# Patient Record
Sex: MALE | Race: WHITE | Employment: UNEMPLOYED | URBAN - METROPOLITAN AREA
[De-identification: names, ages, dates, MRNs, and addresses within clinical notes are randomized per-mention and may not be internally consistent; named-entity substitution may affect disease eponyms.]

---

## 2024-08-28 ENCOUNTER — HOSPITAL ENCOUNTER (EMERGENCY)
Age: 44
Discharge: HOME | End: 2024-08-28
Attending: EMERGENCY MEDICINE
Payer: MEDICAID

## 2024-08-28 ENCOUNTER — APPOINTMENT (OUTPATIENT)
Dept: CT IMAGING | Age: 44
End: 2024-08-28
Payer: MEDICAID

## 2024-08-28 ENCOUNTER — APPOINTMENT (OUTPATIENT)
Dept: GENERAL RADIOLOGY | Age: 44
End: 2024-08-28
Payer: MEDICAID

## 2024-08-28 VITALS
SYSTOLIC BLOOD PRESSURE: 112 MMHG | OXYGEN SATURATION: 98 % | HEIGHT: 71 IN | TEMPERATURE: 98 F | DIASTOLIC BLOOD PRESSURE: 71 MMHG | HEART RATE: 77 BPM | WEIGHT: 155 LBS | BODY MASS INDEX: 21.7 KG/M2 | RESPIRATION RATE: 15 BRPM
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
## 2024-08-28 DIAGNOSIS — S82.141A CLOSED FRACTURE OF RIGHT TIBIAL PLATEAU, INITIAL ENCOUNTER: Primary | ICD-10-CM

## 2024-08-28 DIAGNOSIS — M25.00 LIPOHEMARTHROSIS: ICD-10-CM

## 2024-08-28 PROCEDURE — 73560 X-RAY EXAM OF KNEE 1 OR 2: CPT

## 2024-08-28 PROCEDURE — 73700 CT LOWER EXTREMITY W/O DYE: CPT

## 2024-08-28 PROCEDURE — 99284 EMERGENCY DEPT VISIT MOD MDM: CPT

## 2024-08-28 PROCEDURE — 6370000000 HC RX 637 (ALT 250 FOR IP): Performed by: PHYSICIAN ASSISTANT

## 2024-08-28 PROCEDURE — 6370000000 HC RX 637 (ALT 250 FOR IP): Performed by: EMERGENCY MEDICINE

## 2024-08-28 RX ORDER — ACETAMINOPHEN 500 MG
1000 TABLET ORAL ONCE
Status: COMPLETED | OUTPATIENT
Start: 2024-08-28 | End: 2024-08-28

## 2024-08-28 RX ORDER — OXYCODONE AND ACETAMINOPHEN 5; 325 MG/1; MG/1
1 TABLET ORAL EVERY 4 HOURS PRN
Qty: 17 TABLET | Refills: 0 | Status: SHIPPED | OUTPATIENT
Start: 2024-08-28 | End: 2024-08-31

## 2024-08-28 RX ORDER — OXYCODONE HYDROCHLORIDE 5 MG/1
5 TABLET ORAL ONCE
Status: COMPLETED | OUTPATIENT
Start: 2024-08-28 | End: 2024-08-28

## 2024-08-28 RX ORDER — IBUPROFEN 800 MG/1
800 TABLET, FILM COATED ORAL ONCE
Status: COMPLETED | OUTPATIENT
Start: 2024-08-28 | End: 2024-08-28

## 2024-08-28 RX ADMIN — ACETAMINOPHEN 1000 MG: 500 TABLET ORAL at 05:14

## 2024-08-28 RX ADMIN — IBUPROFEN 800 MG: 800 TABLET, FILM COATED ORAL at 04:13

## 2024-08-28 RX ADMIN — OXYCODONE HYDROCHLORIDE 5 MG: 5 TABLET ORAL at 01:47

## 2024-08-28 ASSESSMENT — PAIN SCALES - GENERAL
PAINLEVEL_OUTOF10: 7
PAINLEVEL_OUTOF10: 9
PAINLEVEL_OUTOF10: 6

## 2024-08-28 ASSESSMENT — PAIN DESCRIPTION - LOCATION
LOCATION: KNEE
LOCATION: KNEE

## 2024-08-28 ASSESSMENT — PAIN DESCRIPTION - ORIENTATION: ORIENTATION: RIGHT

## 2024-08-28 ASSESSMENT — LIFESTYLE VARIABLES
HOW MANY STANDARD DRINKS CONTAINING ALCOHOL DO YOU HAVE ON A TYPICAL DAY: 1 OR 2
HOW OFTEN DO YOU HAVE A DRINK CONTAINING ALCOHOL: MONTHLY OR LESS

## 2024-08-28 ASSESSMENT — PAIN DESCRIPTION - DESCRIPTORS: DESCRIPTORS: ACHING
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
## 2024-08-30 ENCOUNTER — OFFICE VISIT (OUTPATIENT)
Dept: ORTHOPEDIC SURGERY | Age: 44
End: 2024-08-30
Payer: MEDICAID

## 2024-08-30 VITALS — WEIGHT: 155 LBS | HEIGHT: 71 IN | BODY MASS INDEX: 21.7 KG/M2

## 2024-08-30 DIAGNOSIS — S82.141A TIBIAL PLATEAU FRACTURE, RIGHT, CLOSED, INITIAL ENCOUNTER: Primary | ICD-10-CM

## 2024-08-30 PROCEDURE — 99204 OFFICE O/P NEW MOD 45 MIN: CPT | Performed by: ORTHOPAEDIC SURGERY
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
## 2024-09-05 ENCOUNTER — TELEPHONE (OUTPATIENT)
Dept: ORTHOPEDIC SURGERY | Age: 44
End: 2024-09-05

## 2024-09-05 NOTE — TELEPHONE ENCOUNTER
Prescription Refill     Medication Name:  PERCOCET  Pharmacy: [facility]  Patient Contact Number:  [phone]

## 2024-11-11 ENCOUNTER — HOSPITAL ENCOUNTER (INPATIENT)
Age: 44
LOS: 3 days | Discharge: HOME | End: 2024-11-15
Attending: EMERGENCY MEDICINE | Admitting: FAMILY MEDICINE
Payer: MEDICAID

## 2024-11-11 DIAGNOSIS — F10.930 ALCOHOL WITHDRAWAL SYNDROME WITHOUT COMPLICATION (HCC): Primary | ICD-10-CM

## 2024-11-11 DIAGNOSIS — R19.7 NAUSEA VOMITING AND DIARRHEA: ICD-10-CM

## 2024-11-11 DIAGNOSIS — R11.2 NAUSEA VOMITING AND DIARRHEA: ICD-10-CM

## 2024-11-11 LAB
ALBUMIN SERPL-MCNC: 5.4 G/DL (ref 3.4–5)
ALBUMIN/GLOB SERPL: 1.6 {RATIO} (ref 1.1–2.2)
ALP SERPL-CCNC: 111 U/L (ref 40–129)
ALT SERPL-CCNC: 56 U/L (ref 10–40)
ANION GAP SERPL CALCULATED.3IONS-SCNC: 26 MMOL/L (ref 3–16)
AST SERPL-CCNC: 59 U/L (ref 15–37)
BASOPHILS # BLD: 0 K/UL (ref 0–0.2)
BASOPHILS NFR BLD: 0.4 %
BILIRUB SERPL-MCNC: 0.4 MG/DL (ref 0–1)
BUN SERPL-MCNC: 9 MG/DL (ref 7–20)
CALCIUM SERPL-MCNC: 10.3 MG/DL (ref 8.3–10.6)
CHLORIDE SERPL-SCNC: 94 MMOL/L (ref 99–110)
CO2 SERPL-SCNC: 21 MMOL/L (ref 21–32)
CREAT SERPL-MCNC: 1.2 MG/DL (ref 0.9–1.3)
DEPRECATED RDW RBC AUTO: 13.5 % (ref 12.4–15.4)
EOSINOPHIL # BLD: 0 K/UL (ref 0–0.6)
EOSINOPHIL NFR BLD: 0 %
GFR SERPLBLD CREATININE-BSD FMLA CKD-EPI: 76 ML/MIN/{1.73_M2}
GLUCOSE SERPL-MCNC: 213 MG/DL (ref 70–99)
HCT VFR BLD AUTO: 44.8 % (ref 40.5–52.5)
HGB BLD-MCNC: 15 G/DL (ref 13.5–17.5)
LIPASE SERPL-CCNC: 51 U/L (ref 13–60)
LYMPHOCYTES # BLD: 0.9 K/UL (ref 1–5.1)
LYMPHOCYTES NFR BLD: 10.2 %
MCH RBC QN AUTO: 32.7 PG (ref 26–34)
MCHC RBC AUTO-ENTMCNC: 33.4 G/DL (ref 31–36)
MCV RBC AUTO: 97.9 FL (ref 80–100)
MONOCYTES # BLD: 0.8 K/UL (ref 0–1.3)
MONOCYTES NFR BLD: 9.2 %
NEUTROPHILS # BLD: 6.7 K/UL (ref 1.7–7.7)
NEUTROPHILS NFR BLD: 80.2 %
PLATELET # BLD AUTO: 267 K/UL (ref 135–450)
PMV BLD AUTO: 7.8 FL (ref 5–10.5)
POTASSIUM SERPL-SCNC: 3.3 MMOL/L (ref 3.5–5.1)
PROT SERPL-MCNC: 8.8 G/DL (ref 6.4–8.2)
RBC # BLD AUTO: 4.58 M/UL (ref 4.2–5.9)
SODIUM SERPL-SCNC: 141 MMOL/L (ref 136–145)
WBC # BLD AUTO: 8.4 K/UL (ref 4–11)

## 2024-11-11 PROCEDURE — 96374 THER/PROPH/DIAG INJ IV PUSH: CPT

## 2024-11-11 PROCEDURE — 82077 ASSAY SPEC XCP UR&BREATH IA: CPT

## 2024-11-11 PROCEDURE — 6360000002 HC RX W HCPCS: Performed by: EMERGENCY MEDICINE

## 2024-11-11 PROCEDURE — 96361 HYDRATE IV INFUSION ADD-ON: CPT

## 2024-11-11 PROCEDURE — 82607 VITAMIN B-12: CPT

## 2024-11-11 PROCEDURE — 36415 COLL VENOUS BLD VENIPUNCTURE: CPT

## 2024-11-11 PROCEDURE — 83690 ASSAY OF LIPASE: CPT

## 2024-11-11 PROCEDURE — 99285 EMERGENCY DEPT VISIT HI MDM: CPT

## 2024-11-11 PROCEDURE — 83605 ASSAY OF LACTIC ACID: CPT

## 2024-11-11 PROCEDURE — 93005 ELECTROCARDIOGRAM TRACING: CPT | Performed by: EMERGENCY MEDICINE

## 2024-11-11 PROCEDURE — 85025 COMPLETE CBC W/AUTO DIFF WBC: CPT

## 2024-11-11 PROCEDURE — 83735 ASSAY OF MAGNESIUM: CPT

## 2024-11-11 PROCEDURE — 96375 TX/PRO/DX INJ NEW DRUG ADDON: CPT

## 2024-11-11 PROCEDURE — 2580000003 HC RX 258: Performed by: EMERGENCY MEDICINE

## 2024-11-11 PROCEDURE — 80053 COMPREHEN METABOLIC PANEL: CPT

## 2024-11-11 PROCEDURE — 99291 CRITICAL CARE FIRST HOUR: CPT

## 2024-11-11 RX ORDER — LORAZEPAM 2 MG/ML
0.5 INJECTION INTRAMUSCULAR ONCE
Status: COMPLETED | OUTPATIENT
Start: 2024-11-11 | End: 2024-11-11

## 2024-11-11 RX ORDER — ONDANSETRON 2 MG/ML
4 INJECTION INTRAMUSCULAR; INTRAVENOUS ONCE
Status: COMPLETED | OUTPATIENT
Start: 2024-11-11 | End: 2024-11-11

## 2024-11-11 RX ORDER — 0.9 % SODIUM CHLORIDE 0.9 %
1000 INTRAVENOUS SOLUTION INTRAVENOUS ONCE
Status: COMPLETED | OUTPATIENT
Start: 2024-11-11 | End: 2024-11-12

## 2024-11-11 RX ADMIN — SODIUM CHLORIDE 1000 ML: 9 INJECTION, SOLUTION INTRAVENOUS at 23:27

## 2024-11-11 RX ADMIN — LORAZEPAM 0.5 MG: 2 INJECTION INTRAMUSCULAR; INTRAVENOUS at 23:47

## 2024-11-11 RX ADMIN — ONDANSETRON 4 MG: 2 INJECTION INTRAMUSCULAR; INTRAVENOUS at 23:27

## 2024-11-11 ASSESSMENT — PAIN DESCRIPTION - ORIENTATION: ORIENTATION: RIGHT;LEFT

## 2024-11-11 ASSESSMENT — PAIN DESCRIPTION - LOCATION: LOCATION: FOOT

## 2024-11-11 ASSESSMENT — PAIN - FUNCTIONAL ASSESSMENT: PAIN_FUNCTIONAL_ASSESSMENT: 0-10

## 2024-11-11 ASSESSMENT — PAIN SCALES - GENERAL: PAINLEVEL_OUTOF10: 4

## 2024-11-12 PROBLEM — F10.930 ALCOHOL WITHDRAWAL SYNDROME, UNCOMPLICATED (HCC): Status: ACTIVE | Noted: 2024-11-12

## 2024-11-12 LAB
ALBUMIN SERPL-MCNC: 4.4 G/DL (ref 3.4–5)
ALBUMIN/GLOB SERPL: 1.7 {RATIO} (ref 1.1–2.2)
ALP SERPL-CCNC: 81 U/L (ref 40–129)
ALT SERPL-CCNC: 41 U/L (ref 10–40)
AMPHETAMINES UR QL SCN>1000 NG/ML: ABNORMAL
ANION GAP SERPL CALCULATED.3IONS-SCNC: 15 MMOL/L (ref 3–16)
AST SERPL-CCNC: 43 U/L (ref 15–37)
BARBITURATES UR QL SCN>200 NG/ML: ABNORMAL
BASE EXCESS BLDV CALC-SCNC: -1.5 MMOL/L (ref -3–3)
BASOPHILS # BLD: 0.1 K/UL (ref 0–0.2)
BASOPHILS NFR BLD: 0.8 %
BENZODIAZ UR QL SCN>200 NG/ML: POSITIVE
BILIRUB SERPL-MCNC: 0.3 MG/DL (ref 0–1)
BUN SERPL-MCNC: 9 MG/DL (ref 7–20)
CALCIUM SERPL-MCNC: 8.9 MG/DL (ref 8.3–10.6)
CANNABINOIDS UR QL SCN>50 NG/ML: POSITIVE
CHLORIDE SERPL-SCNC: 105 MMOL/L (ref 99–110)
CO2 BLDV-SCNC: 21 MMOL/L
CO2 SERPL-SCNC: 22 MMOL/L (ref 21–32)
COCAINE UR QL SCN: ABNORMAL
COHGB MFR BLDV: 2.5 % (ref 0–1.5)
CREAT SERPL-MCNC: 0.8 MG/DL (ref 0.9–1.3)
DEPRECATED RDW RBC AUTO: 13.2 % (ref 12.4–15.4)
DRUG SCREEN COMMENT UR-IMP: ABNORMAL
EKG ATRIAL RATE: 86 BPM
EKG DIAGNOSIS: NORMAL
EKG P AXIS: 62 DEGREES
EKG P-R INTERVAL: 158 MS
EKG Q-T INTERVAL: 408 MS
EKG QRS DURATION: 88 MS
EKG QTC CALCULATION (BAZETT): 488 MS
EKG R AXIS: 66 DEGREES
EKG T AXIS: 78 DEGREES
EKG VENTRICULAR RATE: 86 BPM
EOSINOPHIL # BLD: 0 K/UL (ref 0–0.6)
EOSINOPHIL NFR BLD: 0.1 %
ETHANOLAMINE SERPL-MCNC: NORMAL MG/DL (ref 0–0.08)
FENTANYL SCREEN, URINE: ABNORMAL
GFR SERPLBLD CREATININE-BSD FMLA CKD-EPI: >90 ML/MIN/{1.73_M2}
GLUCOSE SERPL-MCNC: 123 MG/DL (ref 70–99)
HCO3 BLDV-SCNC: 20.6 MMOL/L (ref 23–29)
HCT VFR BLD AUTO: 37.5 % (ref 40.5–52.5)
HGB BLD-MCNC: 12.6 G/DL (ref 13.5–17.5)
LACTATE BLDV-SCNC: 1.5 MMOL/L (ref 0.4–1.9)
LACTATE BLDV-SCNC: 7.1 MMOL/L (ref 0.4–1.9)
LYMPHOCYTES # BLD: 0.8 K/UL (ref 1–5.1)
LYMPHOCYTES NFR BLD: 10.3 %
MAGNESIUM SERPL-MCNC: 1.74 MG/DL (ref 1.8–2.4)
MAGNESIUM SERPL-MCNC: 1.85 MG/DL (ref 1.8–2.4)
MCH RBC QN AUTO: 32.8 PG (ref 26–34)
MCHC RBC AUTO-ENTMCNC: 33.7 G/DL (ref 31–36)
MCV RBC AUTO: 97.3 FL (ref 80–100)
METHADONE UR QL SCN>300 NG/ML: ABNORMAL
METHGB MFR BLDV: 0.2 %
MONOCYTES # BLD: 0.9 K/UL (ref 0–1.3)
MONOCYTES NFR BLD: 11 %
NEUTROPHILS # BLD: 6.2 K/UL (ref 1.7–7.7)
NEUTROPHILS NFR BLD: 77.8 %
O2 THERAPY: ABNORMAL
OPIATES UR QL SCN>300 NG/ML: ABNORMAL
OXYCODONE UR QL SCN: ABNORMAL
PCO2 BLDV: 28 MMHG (ref 40–50)
PCP UR QL SCN>25 NG/ML: ABNORMAL
PH BLDV: 7.48 [PH] (ref 7.35–7.45)
PH UR STRIP: 7 [PH]
PLATELET # BLD AUTO: 215 K/UL (ref 135–450)
PMV BLD AUTO: 8.4 FL (ref 5–10.5)
PO2 BLDV: 43.6 MMHG (ref 25–40)
POTASSIUM SERPL-SCNC: 4 MMOL/L (ref 3.5–5.1)
PROT SERPL-MCNC: 7 G/DL (ref 6.4–8.2)
RBC # BLD AUTO: 3.85 M/UL (ref 4.2–5.9)
SAO2 % BLDV: 83 %
SODIUM SERPL-SCNC: 142 MMOL/L (ref 136–145)
VIT B12 SERPL-MCNC: 445 PG/ML (ref 211–911)
WBC # BLD AUTO: 8 K/UL (ref 4–11)

## 2024-11-12 PROCEDURE — 2580000003 HC RX 258: Performed by: EMERGENCY MEDICINE

## 2024-11-12 PROCEDURE — 80307 DRUG TEST PRSMV CHEM ANLYZR: CPT

## 2024-11-12 PROCEDURE — 6360000002 HC RX W HCPCS: Performed by: FAMILY MEDICINE

## 2024-11-12 PROCEDURE — 93010 ELECTROCARDIOGRAM REPORT: CPT | Performed by: INTERNAL MEDICINE

## 2024-11-12 PROCEDURE — 6370000000 HC RX 637 (ALT 250 FOR IP): Performed by: FAMILY MEDICINE

## 2024-11-12 PROCEDURE — 96375 TX/PRO/DX INJ NEW DRUG ADDON: CPT

## 2024-11-12 PROCEDURE — 36415 COLL VENOUS BLD VENIPUNCTURE: CPT

## 2024-11-12 PROCEDURE — 2500000003 HC RX 250 WO HCPCS: Performed by: FAMILY MEDICINE

## 2024-11-12 PROCEDURE — 2500000003 HC RX 250 WO HCPCS: Performed by: EMERGENCY MEDICINE

## 2024-11-12 PROCEDURE — 96376 TX/PRO/DX INJ SAME DRUG ADON: CPT

## 2024-11-12 PROCEDURE — 82803 BLOOD GASES ANY COMBINATION: CPT

## 2024-11-12 PROCEDURE — 6360000002 HC RX W HCPCS: Performed by: EMERGENCY MEDICINE

## 2024-11-12 PROCEDURE — 2580000003 HC RX 258: Performed by: FAMILY MEDICINE

## 2024-11-12 PROCEDURE — 85025 COMPLETE CBC W/AUTO DIFF WBC: CPT

## 2024-11-12 PROCEDURE — 80053 COMPREHEN METABOLIC PANEL: CPT

## 2024-11-12 PROCEDURE — 6370000000 HC RX 637 (ALT 250 FOR IP)

## 2024-11-12 PROCEDURE — 83605 ASSAY OF LACTIC ACID: CPT

## 2024-11-12 PROCEDURE — 83735 ASSAY OF MAGNESIUM: CPT

## 2024-11-12 PROCEDURE — 6370000000 HC RX 637 (ALT 250 FOR IP): Performed by: EMERGENCY MEDICINE

## 2024-11-12 PROCEDURE — 2000000000 HC ICU R&B

## 2024-11-12 RX ORDER — SODIUM CHLORIDE 0.9 % (FLUSH) 0.9 %
5-40 SYRINGE (ML) INJECTION EVERY 12 HOURS SCHEDULED
Status: DISCONTINUED | OUTPATIENT
Start: 2024-11-12 | End: 2024-11-12

## 2024-11-12 RX ORDER — POTASSIUM CHLORIDE 7.45 MG/ML
10 INJECTION INTRAVENOUS PRN
Status: DISCONTINUED | OUTPATIENT
Start: 2024-11-12 | End: 2024-11-14

## 2024-11-12 RX ORDER — POTASSIUM CHLORIDE 1500 MG/1
40 TABLET, EXTENDED RELEASE ORAL PRN
Status: DISCONTINUED | OUTPATIENT
Start: 2024-11-12 | End: 2024-11-14

## 2024-11-12 RX ORDER — SODIUM CHLORIDE 0.9 % (FLUSH) 0.9 %
5-40 SYRINGE (ML) INJECTION PRN
Status: DISCONTINUED | OUTPATIENT
Start: 2024-11-12 | End: 2024-11-15 | Stop reason: HOSPADM

## 2024-11-12 RX ORDER — ATORVASTATIN CALCIUM 20 MG/1
20 TABLET, FILM COATED ORAL DAILY
COMMUNITY
Start: 2024-09-12

## 2024-11-12 RX ORDER — MAGNESIUM SULFATE IN WATER 40 MG/ML
2000 INJECTION, SOLUTION INTRAVENOUS PRN
Status: DISCONTINUED | OUTPATIENT
Start: 2024-11-12 | End: 2024-11-14

## 2024-11-12 RX ORDER — LORAZEPAM 1 MG/1
1 TABLET ORAL
Status: DISCONTINUED | OUTPATIENT
Start: 2024-11-12 | End: 2024-11-15 | Stop reason: HOSPADM

## 2024-11-12 RX ORDER — ACETAMINOPHEN 325 MG/1
TABLET ORAL
COMMUNITY

## 2024-11-12 RX ORDER — NICOTINE 21 MG/24HR
1 PATCH, TRANSDERMAL 24 HOURS TRANSDERMAL DAILY
Status: DISCONTINUED | OUTPATIENT
Start: 2024-11-12 | End: 2024-11-15 | Stop reason: HOSPADM

## 2024-11-12 RX ORDER — METOPROLOL SUCCINATE 25 MG/1
12.5 TABLET, EXTENDED RELEASE ORAL DAILY
Status: DISCONTINUED | OUTPATIENT
Start: 2024-11-12 | End: 2024-11-15 | Stop reason: HOSPADM

## 2024-11-12 RX ORDER — MUPIROCIN 20 MG/G
OINTMENT TOPICAL 2 TIMES DAILY
Status: DISCONTINUED | OUTPATIENT
Start: 2024-11-12 | End: 2024-11-15 | Stop reason: HOSPADM

## 2024-11-12 RX ORDER — DEXMEDETOMIDINE HYDROCHLORIDE 4 UG/ML
.1-1.5 INJECTION, SOLUTION INTRAVENOUS CONTINUOUS
Status: DISCONTINUED | OUTPATIENT
Start: 2024-11-12 | End: 2024-11-15 | Stop reason: HOSPADM

## 2024-11-12 RX ORDER — LORAZEPAM 1 MG/1
4 TABLET ORAL
Status: DISCONTINUED | OUTPATIENT
Start: 2024-11-12 | End: 2024-11-14

## 2024-11-12 RX ORDER — LORAZEPAM 2 MG/ML
3 INJECTION INTRAMUSCULAR
Status: DISCONTINUED | OUTPATIENT
Start: 2024-11-12 | End: 2024-11-14

## 2024-11-12 RX ORDER — SODIUM CHLORIDE, SODIUM LACTATE, POTASSIUM CHLORIDE, CALCIUM CHLORIDE 600; 310; 30; 20 MG/100ML; MG/100ML; MG/100ML; MG/100ML
INJECTION, SOLUTION INTRAVENOUS CONTINUOUS
Status: DISCONTINUED | OUTPATIENT
Start: 2024-11-12 | End: 2024-11-15 | Stop reason: HOSPADM

## 2024-11-12 RX ORDER — SPIRONOLACTONE 25 MG/1
TABLET ORAL
COMMUNITY
Start: 2024-07-18

## 2024-11-12 RX ORDER — SODIUM CHLORIDE 9 MG/ML
INJECTION, SOLUTION INTRAVENOUS PRN
Status: DISCONTINUED | OUTPATIENT
Start: 2024-11-12 | End: 2024-11-12

## 2024-11-12 RX ORDER — SODIUM CHLORIDE 9 MG/ML
INJECTION, SOLUTION INTRAVENOUS PRN
Status: DISCONTINUED | OUTPATIENT
Start: 2024-11-12 | End: 2024-11-15 | Stop reason: HOSPADM

## 2024-11-12 RX ORDER — VALSARTAN 40 MG/1
TABLET ORAL
COMMUNITY
Start: 2024-08-19

## 2024-11-12 RX ORDER — ASPIRIN 81 MG/1
81 TABLET, CHEWABLE ORAL DAILY
COMMUNITY
Start: 2024-05-09

## 2024-11-12 RX ORDER — LANOLIN ALCOHOL/MO/W.PET/CERES
CREAM (GRAM) TOPICAL
COMMUNITY
Start: 2024-10-23

## 2024-11-12 RX ORDER — POTASSIUM CHLORIDE 7.45 MG/ML
10 INJECTION INTRAVENOUS ONCE
Status: COMPLETED | OUTPATIENT
Start: 2024-11-12 | End: 2024-11-12

## 2024-11-12 RX ORDER — SODIUM CHLORIDE 0.9 % (FLUSH) 0.9 %
5-40 SYRINGE (ML) INJECTION PRN
Status: DISCONTINUED | OUTPATIENT
Start: 2024-11-12 | End: 2024-11-12

## 2024-11-12 RX ORDER — ONDANSETRON 2 MG/ML
4 INJECTION INTRAMUSCULAR; INTRAVENOUS EVERY 6 HOURS PRN
Status: DISCONTINUED | OUTPATIENT
Start: 2024-11-12 | End: 2024-11-15 | Stop reason: HOSPADM

## 2024-11-12 RX ORDER — SPIRONOLACTONE 25 MG/1
25 TABLET ORAL DAILY
Status: DISCONTINUED | OUTPATIENT
Start: 2024-11-12 | End: 2024-11-15 | Stop reason: HOSPADM

## 2024-11-12 RX ORDER — DIAZEPAM 5 MG/1
5 TABLET ORAL 3 TIMES DAILY
Status: DISCONTINUED | OUTPATIENT
Start: 2024-11-12 | End: 2024-11-12

## 2024-11-12 RX ORDER — ACETAMINOPHEN 325 MG/1
650 TABLET ORAL EVERY 6 HOURS PRN
Status: DISCONTINUED | OUTPATIENT
Start: 2024-11-12 | End: 2024-11-15 | Stop reason: HOSPADM

## 2024-11-12 RX ORDER — LORAZEPAM 1 MG/1
2 TABLET ORAL
Status: DISCONTINUED | OUTPATIENT
Start: 2024-11-12 | End: 2024-11-15 | Stop reason: HOSPADM

## 2024-11-12 RX ORDER — ACETAMINOPHEN 650 MG/1
650 SUPPOSITORY RECTAL EVERY 6 HOURS PRN
Status: DISCONTINUED | OUTPATIENT
Start: 2024-11-12 | End: 2024-11-15 | Stop reason: HOSPADM

## 2024-11-12 RX ORDER — LORAZEPAM 1 MG/1
3 TABLET ORAL
Status: DISCONTINUED | OUTPATIENT
Start: 2024-11-12 | End: 2024-11-14

## 2024-11-12 RX ORDER — ENOXAPARIN SODIUM 100 MG/ML
40 INJECTION SUBCUTANEOUS DAILY
Status: DISCONTINUED | OUTPATIENT
Start: 2024-11-12 | End: 2024-11-15 | Stop reason: HOSPADM

## 2024-11-12 RX ORDER — POLYETHYLENE GLYCOL 3350 17 G/17G
17 POWDER, FOR SOLUTION ORAL DAILY PRN
Status: DISCONTINUED | OUTPATIENT
Start: 2024-11-12 | End: 2024-11-15 | Stop reason: HOSPADM

## 2024-11-12 RX ORDER — METOPROLOL SUCCINATE 25 MG/1
12.5 TABLET, EXTENDED RELEASE ORAL DAILY
COMMUNITY
Start: 2024-09-08

## 2024-11-12 RX ORDER — PROMETHAZINE HYDROCHLORIDE 25 MG/1
12.5 TABLET ORAL EVERY 6 HOURS PRN
Status: DISCONTINUED | OUTPATIENT
Start: 2024-11-12 | End: 2024-11-15 | Stop reason: HOSPADM

## 2024-11-12 RX ORDER — LANOLIN ALCOHOL/MO/W.PET/CERES
100 CREAM (GRAM) TOPICAL DAILY
Status: DISCONTINUED | OUTPATIENT
Start: 2024-11-12 | End: 2024-11-15 | Stop reason: HOSPADM

## 2024-11-12 RX ORDER — ATORVASTATIN CALCIUM 10 MG/1
20 TABLET, FILM COATED ORAL DAILY
Status: DISCONTINUED | OUTPATIENT
Start: 2024-11-12 | End: 2024-11-15 | Stop reason: HOSPADM

## 2024-11-12 RX ORDER — SODIUM CHLORIDE 0.9 % (FLUSH) 0.9 %
5-40 SYRINGE (ML) INJECTION EVERY 12 HOURS SCHEDULED
Status: DISCONTINUED | OUTPATIENT
Start: 2024-11-12 | End: 2024-11-15 | Stop reason: HOSPADM

## 2024-11-12 RX ORDER — VALSARTAN 80 MG/1
40 TABLET ORAL DAILY
Status: DISCONTINUED | OUTPATIENT
Start: 2024-11-12 | End: 2024-11-15 | Stop reason: HOSPADM

## 2024-11-12 RX ORDER — GABAPENTIN 300 MG/1
300 CAPSULE ORAL 3 TIMES DAILY
COMMUNITY
Start: 2024-10-22

## 2024-11-12 RX ORDER — LORAZEPAM 2 MG/ML
1 INJECTION INTRAMUSCULAR
Status: DISCONTINUED | OUTPATIENT
Start: 2024-11-12 | End: 2024-11-15 | Stop reason: HOSPADM

## 2024-11-12 RX ORDER — ASPIRIN 81 MG/1
81 TABLET, CHEWABLE ORAL DAILY
Status: DISCONTINUED | OUTPATIENT
Start: 2024-11-12 | End: 2024-11-15 | Stop reason: HOSPADM

## 2024-11-12 RX ORDER — HYDRALAZINE HYDROCHLORIDE 20 MG/ML
10 INJECTION INTRAMUSCULAR; INTRAVENOUS EVERY 6 HOURS PRN
Status: DISCONTINUED | OUTPATIENT
Start: 2024-11-12 | End: 2024-11-15 | Stop reason: HOSPADM

## 2024-11-12 RX ORDER — LORAZEPAM 2 MG/ML
2 INJECTION INTRAMUSCULAR
Status: DISCONTINUED | OUTPATIENT
Start: 2024-11-12 | End: 2024-11-15 | Stop reason: HOSPADM

## 2024-11-12 RX ORDER — LORAZEPAM 2 MG/ML
4 INJECTION INTRAMUSCULAR
Status: DISCONTINUED | OUTPATIENT
Start: 2024-11-12 | End: 2024-11-14

## 2024-11-12 RX ADMIN — VALSARTAN 40 MG: 80 TABLET, FILM COATED ORAL at 10:33

## 2024-11-12 RX ADMIN — LORAZEPAM 2 MG: 2 INJECTION INTRAMUSCULAR; INTRAVENOUS at 08:33

## 2024-11-12 RX ADMIN — SODIUM CHLORIDE, POTASSIUM CHLORIDE, SODIUM LACTATE AND CALCIUM CHLORIDE: 600; 310; 30; 20 INJECTION, SOLUTION INTRAVENOUS at 19:45

## 2024-11-12 RX ADMIN — LORAZEPAM 3 MG: 1 TABLET ORAL at 03:17

## 2024-11-12 RX ADMIN — SPIRONOLACTONE 25 MG: 25 TABLET, FILM COATED ORAL at 10:33

## 2024-11-12 RX ADMIN — Medication 100 MG: at 10:33

## 2024-11-12 RX ADMIN — LORAZEPAM 4 MG: 2 INJECTION INTRAMUSCULAR; INTRAVENOUS at 02:06

## 2024-11-12 RX ADMIN — LORAZEPAM 4 MG: 2 INJECTION INTRAMUSCULAR; INTRAVENOUS at 04:10

## 2024-11-12 RX ADMIN — ACETAMINOPHEN 650 MG: 325 TABLET ORAL at 17:38

## 2024-11-12 RX ADMIN — SODIUM CHLORIDE, PRESERVATIVE FREE 10 ML: 5 INJECTION INTRAVENOUS at 19:46

## 2024-11-12 RX ADMIN — SODIUM CHLORIDE, POTASSIUM CHLORIDE, SODIUM LACTATE AND CALCIUM CHLORIDE: 600; 310; 30; 20 INJECTION, SOLUTION INTRAVENOUS at 02:36

## 2024-11-12 RX ADMIN — LORAZEPAM 4 MG: 2 INJECTION INTRAMUSCULAR; INTRAVENOUS at 23:54

## 2024-11-12 RX ADMIN — LORAZEPAM 4 MG: 2 INJECTION INTRAMUSCULAR; INTRAVENOUS at 10:38

## 2024-11-12 RX ADMIN — ASPIRIN 81 MG: 81 TABLET, CHEWABLE ORAL at 10:33

## 2024-11-12 RX ADMIN — SODIUM CHLORIDE, PRESERVATIVE FREE 10 ML: 5 INJECTION INTRAVENOUS at 08:38

## 2024-11-12 RX ADMIN — LORAZEPAM 4 MG: 2 INJECTION INTRAMUSCULAR; INTRAVENOUS at 20:31

## 2024-11-12 RX ADMIN — THIAMINE HYDROCHLORIDE: 100 INJECTION, SOLUTION INTRAMUSCULAR; INTRAVENOUS at 00:34

## 2024-11-12 RX ADMIN — SODIUM CHLORIDE, POTASSIUM CHLORIDE, SODIUM LACTATE AND CALCIUM CHLORIDE: 600; 310; 30; 20 INJECTION, SOLUTION INTRAVENOUS at 11:51

## 2024-11-12 RX ADMIN — LORAZEPAM 2 MG: 2 INJECTION INTRAMUSCULAR; INTRAVENOUS at 12:50

## 2024-11-12 RX ADMIN — LORAZEPAM 3 MG: 2 INJECTION INTRAMUSCULAR; INTRAVENOUS at 14:16

## 2024-11-12 RX ADMIN — MUPIROCIN: 20 OINTMENT TOPICAL at 08:37

## 2024-11-12 RX ADMIN — LORAZEPAM 4 MG: 2 INJECTION INTRAMUSCULAR; INTRAVENOUS at 00:22

## 2024-11-12 RX ADMIN — ONDANSETRON 4 MG: 2 INJECTION INTRAMUSCULAR; INTRAVENOUS at 04:23

## 2024-11-12 RX ADMIN — ATORVASTATIN CALCIUM 20 MG: 10 TABLET, FILM COATED ORAL at 10:33

## 2024-11-12 RX ADMIN — POTASSIUM CHLORIDE 10 MEQ: 7.46 INJECTION, SOLUTION INTRAVENOUS at 00:46

## 2024-11-12 RX ADMIN — LORAZEPAM 4 MG: 2 INJECTION INTRAMUSCULAR; INTRAVENOUS at 05:12

## 2024-11-12 RX ADMIN — METOPROLOL SUCCINATE 12.5 MG: 25 TABLET, EXTENDED RELEASE ORAL at 10:34

## 2024-11-12 RX ADMIN — MUPIROCIN: 20 OINTMENT TOPICAL at 19:46

## 2024-11-12 RX ADMIN — Medication 1 MCG/KG/HR: at 05:48

## 2024-11-12 RX ADMIN — LORAZEPAM 3 MG: 2 INJECTION INTRAMUSCULAR; INTRAVENOUS at 17:16

## 2024-11-12 RX ADMIN — ENOXAPARIN SODIUM 40 MG: 100 INJECTION SUBCUTANEOUS at 08:37

## 2024-11-12 ASSESSMENT — PAIN SCALES - GENERAL
PAINLEVEL_OUTOF10: 0
PAINLEVEL_OUTOF10: 4

## 2024-11-12 ASSESSMENT — PAIN DESCRIPTION - LOCATION: LOCATION: WRIST

## 2024-11-12 NOTE — PROGRESS NOTES
0140- at room 239 from ED.   AO x4, on room air, vital signs stable. Afebrile.   Oriented to the room, safety measures applied, call light within reach.  Bathed with CHG wipes.

## 2024-11-12 NOTE — PROGRESS NOTES
4 Eyes Skin Assessment     NAME:  Julian Frost  YOB: 1980  MEDICAL RECORD NUMBER:  [id number]    The patient is being assessed for  Admission    I agree that at least one RN has performed a thorough Head to Toe Skin Assessment on the patient. ALL assessment sites listed below have been assessed.      Areas assessed by both nurses:    Head, Face, Ears, Shoulders, Back, Chest, Arms, Elbows, Hands, Sacrum. Buttock, Coccyx, Ischium, Legs. Feet and Heels, and Under Medical Devices         Does the Patient have a Wound? No noted wound(s)       Adrián Prevention initiated by RN: Yes  Wound Care Orders initiated by RN: No    Pressure Injury (Stage 3,4, Unstageable, DTI, NWPT, and Complex wounds) if present, place Wound referral order by RN under : No    New Ostomies, if present place, Ostomy referral order under : No     Nurse 1 eSignature: Electronically signed by JACKIE TOWNSEND RN on 11/12/24 at 3:27 AM EST    **SHARE this note so that the co-signing nurse can place an eSignature**    Nurse 2 eSignature: Electronically signed by Abilio Morataya RN on 11/12/24 at 7:00 AM EST

## 2024-11-12 NOTE — H&P
Hospital Medicine History & Physical      Date of Admission: 11/11/2024    Date of Service:  Pt seen/examined on 11/12/2024    [x]Admitted to Inpatient with expected LOS greater than two midnights due to medical therapy.  []Placed in Observation status.    Chief Admission Complaint: Alcohol withdrawal    Presenting Admission History:      44 y.o. male with a past medical history significant for alcohol abuse presents with alcohol withdrawal.  Reports that last drink was around 9 PM approximately 27 hours prior to presentation.  He reports symptoms of tremor and agitation.  He reports that he has a history of seizures and sees a neurologist, but is not on any medication for this.  He reports that he does take gabapentin.    Patient is a poor historian is difficult to understand timeline.  His alcohol intake is reportedly 5 vodka tonics per week.  It is felt that he is likely minimizing the amount that he actually takes.  He denies any other illicit drug use    ED evaluation  Blood pressure 192/106.  Pulse 86.  Respirations 20  Potassium 3.3-repleted in ED  Magnesium 1.7-repleted in ED  Lactic acid 7.1.  Repeat ordered and pending  VBG 7.48 4/28/1941 3.6/20.6    EKG shows normal sinus rhythm with QTc of 488.  My interpretation      Discussed with the ED physician: Dr. Cuellar  Assessment/Plan:      Current Principal Problem:  Alcohol withdrawal syndrome, uncomplicated (HCC)    #1 alcohol withdrawal  Alcohol withdrawal protocol  Given history of seizures and marked lactic acidosis, I feel that patient would best be served by admission to ICU.  CIWA protocol with Valium.  I have also ordered scheduled Valium as patient is already showing signs of withdrawal with tremor.  Status post banana bag in the emergency department  Continue IV fluids 125/h    2.  Hypertension  Suspect effect from withdrawal  Hydralazine 10 mg IV for SBP greater than 180  Continue to monitor  I also restarted blood pressure medications that we  interpreted for clinical significance.   Recent Labs     11/11/24  2328   WBC 8.4   HGB 15.0   HCT 44.8        Recent Labs     11/11/24  2328      K 3.3*   CL 94*   CO2 21   BUN 9   CREATININE 1.2   CALCIUM 10.3   MG 1.74*     No results for input(s): \"PROBNP\", \"TROPHS\" in the last 72 hours.  No results for input(s): \"LABA1C\" in the last 72 hours.  Recent Labs     11/11/24  2328   AST 59*   ALT 56*   BILITOT 0.4   ALKPHOS 111     No results for input(s): \"INR\", \"LACTA\", \"TSH\" in the last 72 hours.     Jigar Oswald MD

## 2024-11-12 NOTE — PROGRESS NOTES
Shift: [id number]    Admitting diagnosis: Alcohol withdrawal syndrome    Presentation to hospital: Vomiting, Tremor, Agitation    Surgery: no no    Nursing assessment at handoff  stable    Emergency Contact/POA:Jose R Frost (Brother )[phone]  Family updated: yes - -    Most recent vitals: /83   Pulse 72   Temp 97.5 °F (36.4 °C) (Axillary)   Resp 15   Wt 62.7 kg (138 lb 3.7 oz)   SpO2 98%   BMI 19.28 kg/m²      Rhythm: Normal Sinus Rhythm 80 and Sinus Tachycardia 110     NC/HFNC- 0 lpm   Respiratory support: - No ventilator support    Vent days: Day 0    Increased O2 requirements: no no    Admission weight Weight - Scale: 62.7 kg (138 lb 3.7 oz)  Today's weight   Wt Readings from Last 1 Encounters:   11/12/24 62.7 kg (138 lb 3.7 oz)         UOP >30ml/hr: no     Hanson need assessed each shift: no    Restraints: no  Order current and documentation up to date?    Lines/Drains  LDA Insertion Date Discontinued Date Dressing Changes   PIV x2 11/11/24 11/12/24     TLC       Arterial       Hanson       Vas Cath      ETT       Surgical drains        Night Shift Hospitalist Interventions    Problem(Brief) Date Time Intervention Physician contacted                                               Drip rates at handoff:    sodium chloride      lactated ringers 125 mL/hr at 11/12/24 1419    dexmedeTOMIDine Stopped (11/12/24 1024)       Hospital Course Daily Updates:  Admit Day# 0   Came to ED due to vomiting, agitation, tremor  -CIWA score high >30 (on PRN ativan 4mg IVP , started on precedex gtts    Day 1-11/12  -Precedex weaned off  -Occasional PRN ativan given  -Mostly behaves himself-can be impulsive and quick to move  - Low urine output- urine very dark, dehydrated.       Lab Data:   CBC:   Recent Labs     11/11/24 2328 11/12/24 0215   WBC 8.4 8.0   HGB 15.0 12.6*   HCT 44.8 37.5*   MCV 97.9 97.3    215     BMP:    Recent Labs     11/11/24 2328 11/12/24 0215    142   K 3.3* 4.0   CO2 21 22   BUN

## 2024-11-12 NOTE — PROGRESS NOTES
Rounded with Dr. Chapman. Plan to wean precedex gtt, continue ativan. Will see patient later this evening.

## 2024-11-12 NOTE — PROGRESS NOTES
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 Is given 2 double shots of vodka tonic ( 4 shots vodka total )daily in exchange for it.

## 2024-11-12 NOTE — PROGRESS NOTES
Wants to call [facility] and confront them about giving him the wrong gabapentin. Wants to leave and go to [facility] [place]. Unable to work phone. C/o pins and needle sensation \"everywhere\" especially legs. Picking at iv's, gown, skin. Mood labile, calm to agitated quickly. Utilizing redirection. Sitter remains at bedside.

## 2024-11-12 NOTE — CARE COORDINATION
Case Management Assessment  Initial Evaluation    Date/Time of Evaluation: 11/12/2024 11:02 AM  Assessment Completed by: Linn Coronado RN    If patient is discharged prior to next notation, then this note serves as note for discharge by case management.    Patient Name: Julian Frost                   YOB: 1980  Diagnosis: Nausea vomiting and diarrhea [R11.2, R19.7]  Alcohol withdrawal syndrome, uncomplicated (HCC) [F10.930]  Alcohol withdrawal syndrome without complication (HCC) [F10.930]                   Date / Time: 11/11/2024 11:15 PM    Patient Admission Status: Inpatient   Readmission Risk (Low < 19, Mod (19-27), High > 27): Readmission Risk Score: 8.6    Current PCP: Niyah Alanis, DO  PCP verified by CM? Yes    Chart Reviewed: Yes      History Provided by:  Pt  Patient Orientation: Person    Patient Cognition: Other (see comment) (answers questions, lethargic and on Precedex gtt. will need follow up discussion)    Hospitalization in the last 30 days (Readmission):  No    If yes, Readmission Assessment in CM Navigator will be completed.    Advance Directives:      Code Status: Full Code   Patient's Primary Decision Maker is: Legal Next of Kin      Discharge Planning:    Patient lives with: Alone Type of Home: Apartment, Other (Comment) (3rd floor)  Primary Care Giver: Self  Patient Support Systems include: Family Members, Friends/Neighbors   Current Financial resources: Medicaid  Current community resources: None  Current services prior to admission: None            Current DME:              Type of Home Care services:  None    ADLS  Prior functional level: Independent in ADLs/IADLs  Current functional level: Assistance with the following:, Shopping, Housework, Cooking, Bathing, Dressing, Mobility    PT AM-PAC:   /24  OT AM-PAC:   /24    Family can provide assistance at DC: No  Would you like Case Management to discuss the discharge plan with any other family members/significant others, and  if so, who? No  Plans to Return to Present Housing: Unknown at present  Other Identified Issues/Barriers to RETURNING to current housing: may want SA inpt. Rehab vs. OP counseling  Potential Assistance needed at discharge: Other (Comment) (inpt. SA rehab, if agreeable)            Potential DME:    Patient expects to discharge to: Unknown  Plan for transportation at discharge: Self    Financial    Payor: [employer] [place]ICA[place] / Plan: [employer] [place] / Product Type: *No Product type* /     Does insurance require precert for SNF: Yes    Potential assistance Purchasing Medications: No  Meds-to-Beds request:        [id number] - [place], [place] - [address] - P [phone] - F [phone]  [address] [address]  Phone: [phone] Fax: [phone]      Notes:    Factors facilitating achievement of predicted outcomes: Friend support, Cooperative, and Pleasant    Barriers to discharge: Stairs at home and hx of alcohol abuse    Additional Case Management Notes: Met with the Pt at the bedside. Sitter in room. Pt is awake and alert to self and place. Able to answer questions, however is lethargic and on precedex gtt. Pt given SA resources folder per request. He will need follow up discussion once mentation clears for dc planning.      The Plan for Transition of Care is related to the following treatment goals of Nausea vomiting and diarrhea [R11.2, R19.7]  Alcohol withdrawal syndrome, uncomplicated (HCC) [F10.930]  Alcohol withdrawal syndrome without complication (HCC) [F10.930]    The Patient and/or Patient Representative Agree with the Discharge Plan? Yes    Linn Coronado RN  Case Management Department  Ph: [phone] Fax: [phone]

## 2024-11-12 NOTE — ED PROVIDER NOTES
EMERGENCY DEPARTMENT ENCOUNTER     [facility]  ED     Pt Name: Julian Frost   MRN: [id number]   Birthdate 1980   Date of evaluation: 11/11/2024   Provider: Jill Del Real MD   PCP: Niyah Alanis DO   Note Started: 1:04 AM EST 11/12/24     CHIEF COMPLAINT:     Chief Complaint   Patient presents with    Vomiting     Started today     Seizures     Per patient but no one witnessed today, per patient \"at least two today\"         HISTORY OF PRESENT ILLNESS:  Adult male who comes in with nausea vomiting and diarrhea which has been going on for about 24 hours.  Patient complains of abdominal pain.  He believes he had a seizure.  Patient said that he was laying down and then he just all white.  He denies any fevers or chills.  Patient states that he typically drinks alcohol daily but has not drank for about 24 hours because of his symptoms.  No urinary symptoms.  No trauma.    PHYSICAL EXAM:    ED Triage Vitals   BP Systolic BP Percentile Diastolic BP Percentile Temp Temp src Pulse Respirations SpO2   11/11/24 2323 -- -- 11/11/24 2321 -- 11/11/24 2320 11/11/24 2321 11/11/24 2321   (!) 192/106   97.9 °F (36.6 °C)  94 18 98 %      Height Weight         -- --                        Physical Exam  Vitals and nursing note reviewed.   Constitutional:       Appearance: He is well-developed. He is diaphoretic. He is not ill-appearing.   HENT:      Head: Normocephalic and atraumatic.      Right Ear: External ear normal.      Left Ear: External ear normal.      Nose: Nose normal.      Mouth/Throat:      Comments: No acute tongue biting however patient has deformity to the tongue on the left from previous seizure where he bit of a part of his tongue.  Eyes:      General: No scleral icterus.        Right eye: No discharge.         Left eye: No discharge.      Conjunctiva/sclera: Conjunctivae normal.   Cardiovascular:      Rate and Rhythm: Normal rate and regular rhythm.      Heart sounds: Normal heart  multi-vitamin with vitamin k 10 mL, thiamine 100 mg ( IntraVENous New Bag 11/12/24 0034)   potassium chloride 10 mEq/100 mL IVPB (Peripheral Line) (10 mEq IntraVENous New Bag 11/12/24 0046)               Total Critical Care time was 35 minutes, excluding separately reportable procedures.  There was a high probability of clinically significant/life threatening deterioration in the patient's condition which required my urgent intervention.       CONSULTS:   Social Determinants:    Disposition Considerations:       I am the primary physician of Record.     FINAL IMPRESSION    1. Alcohol withdrawal syndrome without complication (HCC)    2. Nausea vomiting and diarrhea         DISPOSITION/PLAN   DISPOSITION Admitted 11/12/2024 01:05:38 AM            PATIENT REFERRED TO:   No follow-up provider specified.   DISCHARGE MEDICATIONS:   New Prescriptions    No medications on file      DISCONTINUED MEDICATIONS:   Discontinued Medications    No medications on file            (Please note that portions of this note were completed with a voice recognition program.  Efforts were made to edit the dictations but occasionally words are mis-transcribed.)     Jill Del Real MD (electronically signed)        Jill Del Real MD  11/12/24 0144

## 2024-11-12 NOTE — PROGRESS NOTES
Shift: 1900-4674     Admitting diagnosis: Alcohol withdrawal syndrome    Presentation to hospital: Vomiting, Tremor, Agitation    Surgery: no no    Nursing assessment at handoff  stable    Emergency Contact/POA:Jose R Frost (Brother )[phone]  Family updated: yes - -    Most recent vitals: /81   Pulse 98   Temp 98.8 °F (37.1 °C) (Oral)   Resp 20   Wt 62.7 kg (138 lb 3.7 oz)   SpO2 97%   BMI 19.28 kg/m²      Rhythm: Normal Sinus Rhythm 80 and Sinus Tachycardia 110     NC/HFNC- 0 lpm   Respiratory support: - No ventilator support    Vent days: Day 0    Increased O2 requirements: no no    Admission weight Weight - Scale: 62.7 kg (138 lb 3.7 oz)  Today's weight   Wt Readings from Last 1 Encounters:   11/12/24 62.7 kg (138 lb 3.7 oz)         UOP >30ml/hr: no     Hanson need assessed each shift: no    Restraints: no  Order current and documentation up to date?    Lines/Drains  LDA Insertion Date Discontinued Date Dressing Changes   PIV x2 11/11/24 11/12/24     TLC       Arterial       Hanson       Vas Cath      ETT       Surgical drains        Night Shift Hospitalist Interventions    Problem(Brief) Date Time Intervention Physician contacted                                               Drip rates at handoff:    sodium chloride      sodium chloride      lactated ringers 125 mL/hr at 11/12/24 0236    dexmedeTOMIDine 1 mcg/kg/hr (11/12/24 0548)       Hospital Course Daily Updates:  Admit Day# 0   Came to ED due to vomiting, agitation, tremor  -CIWA score high >30 (on PRN ativan 4mg IVP , started on precedex gtts      Lab Data:   CBC:   Recent Labs     11/11/24 2328 11/12/24 0215   WBC 8.4 8.0   HGB 15.0 12.6*   HCT 44.8 37.5*   MCV 97.9 97.3    215     BMP:    Recent Labs     11/11/24 2328 11/12/24 0215    142   K 3.3* 4.0   CO2 21 22   BUN 9 9   CREATININE 1.2 0.8*     LIVR:   Recent Labs     11/11/24 2328 11/12/24 0215   AST 59* 43*   ALT 56* 41*     PT/INR: No results for input(s): \"INR\"

## 2024-11-12 NOTE — PROGRESS NOTES
Wants to leave now and go to [employer] and get vodka and a new bottle of neurontin. Attempts to re-orient unsuccessful. Able to distract to new topics. IV's with coban. Needs frequent reminders not to pick at iv sites. Sitter at bedside.

## 2024-11-12 NOTE — PROGRESS NOTES
Hospital Medicine Progress Note  V 10.25      Date of Admission: 11/11/2024    Hospital Day: 2      Chief Admission Complaint:  Alcohol Withdrawal    Subjective:  Patient seen and examined this morning. He is quite drowsy from the Precedex gtt. He denies any chest pain or shortness of breath.     Presenting Admission History:       44 y.o. male with a past medical history significant for alcohol abuse presents with alcohol withdrawal.  Reports that last drink was around 9 PM approximately 27 hours prior to presentation.  He reports symptoms of tremor and agitation.  He reports that he has a history of seizures and sees a neurologist, but is not on any medication for this.  He reports that he does take gabapentin.     Patient is a poor historian is difficult to understand timeline.  His alcohol intake is reportedly 5 vodka tonics per week.  It is felt that he is likely minimizing the amount that he actually takes.  He denies any other illicit drug use    Assessment/Plan:      Current Principal Problem:  Alcohol withdrawal syndrome, uncomplicated (HCC)    Alcohol Use  Alcohol Withdrawal   - Timing of last drink at 9 pm on 11/10  - Continue to wean Precedex gtt  - Continue CIWA protocol     Hypertension   - Improved   - Continue valsartan, spironolactone and metoprolol     Seizures   - Continue seizure precautions   - Diagnosed with Psychogenic Non-Epileptic Seizures, follows with  neurology     Elevated LFTs  - Likely secondary to alcohol use   - Asymptomatic   - Down trending   - Will continue to monitor     Lactic Acidosis   - Resolved   - Likely secondary to alcohol use     CAD   - Continue aspirin and statin     HFrEF (recovered EF)  - Does not appear to be an acute exacerbation at this time   - Last Echo completed at  on 10/2024 - EF of 55-60%  - GDMT with metoprolol, valsartan, spironolactone, jardiance?     Hypokalemia   - Resolved     Hypomagnesemia   - Resolved     Ongoing threat to life and/or bodily

## 2024-11-13 LAB
ALBUMIN SERPL-MCNC: 3.2 G/DL (ref 3.4–5)
ALBUMIN/GLOB SERPL: 2 {RATIO} (ref 1.1–2.2)
ALP SERPL-CCNC: 57 U/L (ref 40–129)
ALT SERPL-CCNC: 38 U/L (ref 10–40)
ANION GAP SERPL CALCULATED.3IONS-SCNC: 16 MMOL/L (ref 3–16)
AST SERPL-CCNC: 54 U/L (ref 15–37)
BASOPHILS # BLD: 0 K/UL (ref 0–0.2)
BASOPHILS NFR BLD: 0.6 %
BILIRUB SERPL-MCNC: 0.3 MG/DL (ref 0–1)
BUN SERPL-MCNC: 5 MG/DL (ref 7–20)
CA-I BLD-SCNC: 1.11 MMOL/L (ref 1.12–1.32)
CALCIUM SERPL-MCNC: 8.2 MG/DL (ref 8.3–10.6)
CHLORIDE SERPL-SCNC: 102 MMOL/L (ref 99–110)
CO2 SERPL-SCNC: 21 MMOL/L (ref 21–32)
CREAT SERPL-MCNC: 0.5 MG/DL (ref 0.9–1.3)
DEPRECATED RDW RBC AUTO: 13.4 % (ref 12.4–15.4)
EOSINOPHIL # BLD: 0 K/UL (ref 0–0.6)
EOSINOPHIL NFR BLD: 0.3 %
GFR SERPLBLD CREATININE-BSD FMLA CKD-EPI: >90 ML/MIN/{1.73_M2}
GLUCOSE SERPL-MCNC: 66 MG/DL (ref 70–99)
HCT VFR BLD AUTO: 32.9 % (ref 40.5–52.5)
HGB BLD-MCNC: 11 G/DL (ref 13.5–17.5)
LACTATE BLDV-SCNC: 5.9 MMOL/L (ref 0.4–2)
LYMPHOCYTES # BLD: 1.9 K/UL (ref 1–5.1)
LYMPHOCYTES NFR BLD: 32.5 %
MAGNESIUM SERPL-MCNC: 1.37 MG/DL (ref 1.8–2.4)
MCH RBC QN AUTO: 33 PG (ref 26–34)
MCHC RBC AUTO-ENTMCNC: 33.5 G/DL (ref 31–36)
MCV RBC AUTO: 98.5 FL (ref 80–100)
MONOCYTES # BLD: 0.5 K/UL (ref 0–1.3)
MONOCYTES NFR BLD: 8.8 %
NEUTROPHILS # BLD: 3.3 K/UL (ref 1.7–7.7)
NEUTROPHILS NFR BLD: 57.8 %
PH BLDV: 7.36 [PH] (ref 7.35–7.45)
PLATELET # BLD AUTO: 156 K/UL (ref 135–450)
PMV BLD AUTO: 8.1 FL (ref 5–10.5)
POTASSIUM SERPL-SCNC: 3.6 MMOL/L (ref 3.5–5.1)
PROT SERPL-MCNC: 4.8 G/DL (ref 6.4–8.2)
RBC # BLD AUTO: 3.34 M/UL (ref 4.2–5.9)
SODIUM SERPL-SCNC: 139 MMOL/L (ref 136–145)
VIT B12 SERPL-MCNC: 388 PG/ML (ref 211–911)
WBC # BLD AUTO: 5.7 K/UL (ref 4–11)

## 2024-11-13 PROCEDURE — 2000000000 HC ICU R&B

## 2024-11-13 PROCEDURE — 6370000000 HC RX 637 (ALT 250 FOR IP): Performed by: FAMILY MEDICINE

## 2024-11-13 PROCEDURE — 80053 COMPREHEN METABOLIC PANEL: CPT

## 2024-11-13 PROCEDURE — 83605 ASSAY OF LACTIC ACID: CPT

## 2024-11-13 PROCEDURE — 85025 COMPLETE CBC W/AUTO DIFF WBC: CPT

## 2024-11-13 PROCEDURE — 82330 ASSAY OF CALCIUM: CPT

## 2024-11-13 PROCEDURE — 2580000003 HC RX 258: Performed by: INTERNAL MEDICINE

## 2024-11-13 PROCEDURE — 83735 ASSAY OF MAGNESIUM: CPT

## 2024-11-13 PROCEDURE — 82607 VITAMIN B-12: CPT

## 2024-11-13 PROCEDURE — 2580000003 HC RX 258: Performed by: FAMILY MEDICINE

## 2024-11-13 PROCEDURE — 6360000002 HC RX W HCPCS: Performed by: FAMILY MEDICINE

## 2024-11-13 PROCEDURE — 6370000000 HC RX 637 (ALT 250 FOR IP)

## 2024-11-13 PROCEDURE — 6360000002 HC RX W HCPCS: Performed by: EMERGENCY MEDICINE

## 2024-11-13 PROCEDURE — 6370000000 HC RX 637 (ALT 250 FOR IP): Performed by: EMERGENCY MEDICINE

## 2024-11-13 RX ORDER — SODIUM CHLORIDE, SODIUM LACTATE, POTASSIUM CHLORIDE, AND CALCIUM CHLORIDE .6; .31; .03; .02 G/100ML; G/100ML; G/100ML; G/100ML
1000 INJECTION, SOLUTION INTRAVENOUS ONCE
Status: COMPLETED | OUTPATIENT
Start: 2024-11-13 | End: 2024-11-13

## 2024-11-13 RX ADMIN — LORAZEPAM 2 MG: 2 INJECTION INTRAMUSCULAR; INTRAVENOUS at 08:47

## 2024-11-13 RX ADMIN — SODIUM CHLORIDE, POTASSIUM CHLORIDE, SODIUM LACTATE AND CALCIUM CHLORIDE: 600; 310; 30; 20 INJECTION, SOLUTION INTRAVENOUS at 14:18

## 2024-11-13 RX ADMIN — LORAZEPAM 2 MG: 2 INJECTION INTRAMUSCULAR; INTRAVENOUS at 04:53

## 2024-11-13 RX ADMIN — VALSARTAN 40 MG: 80 TABLET, FILM COATED ORAL at 07:39

## 2024-11-13 RX ADMIN — MUPIROCIN: 20 OINTMENT TOPICAL at 20:34

## 2024-11-13 RX ADMIN — MAGNESIUM SULFATE HEPTAHYDRATE 2000 MG: 40 INJECTION, SOLUTION INTRAVENOUS at 06:21

## 2024-11-13 RX ADMIN — LORAZEPAM 3 MG: 2 INJECTION INTRAMUSCULAR; INTRAVENOUS at 10:57

## 2024-11-13 RX ADMIN — ACETAMINOPHEN 650 MG: 325 TABLET ORAL at 20:31

## 2024-11-13 RX ADMIN — LORAZEPAM 4 MG: 2 INJECTION INTRAMUSCULAR; INTRAVENOUS at 03:00

## 2024-11-13 RX ADMIN — ATORVASTATIN CALCIUM 20 MG: 10 TABLET, FILM COATED ORAL at 07:40

## 2024-11-13 RX ADMIN — LORAZEPAM 4 MG: 2 INJECTION INTRAMUSCULAR; INTRAVENOUS at 17:14

## 2024-11-13 RX ADMIN — LORAZEPAM 2 MG: 2 INJECTION INTRAMUSCULAR; INTRAVENOUS at 13:32

## 2024-11-13 RX ADMIN — METOPROLOL SUCCINATE 12.5 MG: 25 TABLET, EXTENDED RELEASE ORAL at 07:39

## 2024-11-13 RX ADMIN — LORAZEPAM 2 MG: 1 TABLET ORAL at 20:31

## 2024-11-13 RX ADMIN — MAGNESIUM SULFATE HEPTAHYDRATE 2000 MG: 40 INJECTION, SOLUTION INTRAVENOUS at 08:32

## 2024-11-13 RX ADMIN — SODIUM CHLORIDE, POTASSIUM CHLORIDE, SODIUM LACTATE AND CALCIUM CHLORIDE: 600; 310; 30; 20 INJECTION, SOLUTION INTRAVENOUS at 03:54

## 2024-11-13 RX ADMIN — LORAZEPAM 2 MG: 2 INJECTION INTRAMUSCULAR; INTRAVENOUS at 18:20

## 2024-11-13 RX ADMIN — SODIUM CHLORIDE, PRESERVATIVE FREE 10 ML: 5 INJECTION INTRAVENOUS at 07:40

## 2024-11-13 RX ADMIN — LORAZEPAM 4 MG: 2 INJECTION INTRAMUSCULAR; INTRAVENOUS at 09:42

## 2024-11-13 RX ADMIN — LORAZEPAM 2 MG: 2 INJECTION INTRAMUSCULAR; INTRAVENOUS at 07:39

## 2024-11-13 RX ADMIN — Medication 100 MG: at 07:46

## 2024-11-13 RX ADMIN — LORAZEPAM 2 MG: 2 INJECTION INTRAMUSCULAR; INTRAVENOUS at 14:29

## 2024-11-13 RX ADMIN — ENOXAPARIN SODIUM 40 MG: 100 INJECTION SUBCUTANEOUS at 07:40

## 2024-11-13 RX ADMIN — ASPIRIN 81 MG: 81 TABLET, CHEWABLE ORAL at 07:40

## 2024-11-13 RX ADMIN — SPIRONOLACTONE 25 MG: 25 TABLET, FILM COATED ORAL at 07:39

## 2024-11-13 RX ADMIN — SODIUM CHLORIDE, POTASSIUM CHLORIDE, SODIUM LACTATE AND CALCIUM CHLORIDE 1000 ML: 600; 310; 30; 20 INJECTION, SOLUTION INTRAVENOUS at 06:11

## 2024-11-13 RX ADMIN — SODIUM CHLORIDE, PRESERVATIVE FREE 10 ML: 5 INJECTION INTRAVENOUS at 20:36

## 2024-11-13 RX ADMIN — MUPIROCIN: 20 OINTMENT TOPICAL at 07:41

## 2024-11-13 ASSESSMENT — PAIN SCALES - GENERAL
PAINLEVEL_OUTOF10: 0

## 2024-11-13 ASSESSMENT — PAIN DESCRIPTION - LOCATION: LOCATION: MOUTH

## 2024-11-13 ASSESSMENT — PAIN DESCRIPTION - DESCRIPTORS: DESCRIPTORS: ACHING

## 2024-11-13 ASSESSMENT — PAIN SCALES - WONG BAKER: WONGBAKER_NUMERICALRESPONSE: NO HURT

## 2024-11-13 NOTE — PROGRESS NOTES
Patient became very agitated and tried to jump out of bed. Sitter at bedside and nurse tried to redirect. We finally convinced patient to sit back in bed. Patient called his brother Finn who told him that he was in this situation because of alcoholism and he was being well taken care of and he needed to listen to us and take the medication. At this point patient's agitated decreased.

## 2024-11-13 NOTE — PROGRESS NOTES
Hospital Medicine Progress Note  V 10.25      Date of Admission: 11/11/2024    Hospital Day: 3      Chief Admission Complaint:  Alcohol Withdrawal    Subjective:  no new c/o.     Presenting Admission History:       44 y.o. male with a past medical history significant for alcohol abuse presents with alcohol withdrawal.  Reports that last drink was around 9 PM approximately 27 hours prior to presentation.  He reports symptoms of tremor and agitation.  He reports that he has a history of seizures and sees a neurologist, but is not on any medication for this.  He reports that he does take gabapentin.     Patient is a poor historian is difficult to understand timeline.  His alcohol intake is reportedly 5 vodka tonics per week.  It is felt that he is likely minimizing the amount that he actually takes.  He denies any other illicit drug use    Assessment/Plan:      Current Principal Problem:  Alcohol withdrawal syndrome, uncomplicated (HCC)      Alcohol Use    Alcohol Withdrawal   - Timing of last drink at 9 pm on 11/10  - Continue to wean Precedex gtt  - Continue CIWA protocol     HTN w/ CAD - w/ known CAD but no evidence of active signs and/or symptoms of ischemia and/or failure. Currently controlled on home meds w/ vitals documented and reviewed.      HyperLipidemia - normally controlled on home Statin. Continued.  Follow up / PCP outpatient for medication initiation and/or adjustment as needed.        Seizures   - Continue seizure precautions   - Diagnosed with Psychogenic Non-Epileptic Seizures, follows with  neurology     Elevated LFTs  - Likely secondary to alcohol use   - Asymptomatic   - Down trending   - Will continue to monitor     HFrEF (recovered EF)  - Does not appear to be an acute exacerbation at this time   - Last Echo completed at  on 10/2024 - EF of 55-60%  - GDMT with metoprolol, valsartan, spironolactone, jardiance?     HypoKalemia - etiology clinically unable to determine.  Followed serial  Potassium, personally reviewed and documented in this note. Replaced PRN.      HypoMagnesemia - etiology clinically unable to determine.  Followed serial Magnesium, personally reviewed and documented in this note. Replaced PRN.          Ongoing threat to life and/or bodily function without ongoing treatment due to:  Alcohol Withdrawal     Consults:      IP CONSULT TO SOCIAL WORK  IP CONSULT TO HOSPITALIST  IP CONSULT TO SOCIAL WORK        --------------------------------------------------  Patient with ongoing risk of seizure, continued on CIWA Protocol w/ IV Benzodiazepine. Closely following signs/sxs of w/drawal per protocol for Benzo dosing decisions as well as mental and respiratory status and vitals as documented in this note and the medical record on 11/13/2024 for evidence ADR due to IV Benzodiazepine usage including delirium, respiratory depression and hypotension.      Medications:        Infusion Medications    sodium chloride      lactated ringers 125 mL/hr at 11/13/24 0354    dexmedeTOMIDine Stopped (11/12/24 1024)     Scheduled Medications    aspirin  81 mg Oral Daily    atorvastatin  20 mg Oral Daily    metoprolol succinate  12.5 mg Oral Daily    spironolactone  25 mg Oral Daily    valsartan  40 mg Oral Daily    sodium chloride flush  5-40 mL IntraVENous 2 times per day    thiamine  100 mg Oral Daily    enoxaparin  40 mg SubCUTAneous Daily    mupirocin   Each Nostril BID    nicotine  1 patch TransDERmal Daily     PRN Meds: LORazepam **OR** LORazepam **OR** LORazepam **OR** LORazepam **OR** LORazepam **OR** LORazepam **OR** LORazepam **OR** LORazepam, sodium chloride flush, sodium chloride, potassium chloride **OR** potassium alternative oral replacement **OR** potassium chloride, magnesium sulfate, polyethylene glycol, acetaminophen **OR** acetaminophen, promethazine **OR** ondansetron, hydrALAZINE    Physical Exam Performed:      General appearance:  Mild distress, appears stated age and

## 2024-11-13 NOTE — PLAN OF CARE
Problem: Discharge Planning  Goal: Discharge to home or other facility with appropriate resources  Outcome: Progressing     Problem: Pain  Goal: Verbalizes/displays adequate comfort level or baseline comfort level  Outcome: Progressing     Problem: Safety - Adult  Goal: Free from fall injury  Outcome: Progressing     Problem: ABCDS Injury Assessment  Goal: Absence of physical injury  Outcome: Progressing     Problem: Risk for Elopement  Goal: Patient will not exit the unit/facility without proper excort  Outcome: Progressing  Flowsheets  Taken 11/13/2024 1200  Nursing Interventions for Elopement Risk:   Assist with personal care needs such as toileting, eating, dressing, as needed to reduce the risk of wandering   Collaborate with treatment team for drug withdrawal symptoms treatment  Taken 11/13/2024 0800  Nursing Interventions for Elopement Risk:   Assist with personal care needs such as toileting, eating, dressing, as needed to reduce the risk of wandering   Collaborate with treatment team for drug withdrawal symptoms treatment

## 2024-11-13 NOTE — PROGRESS NOTES
Shift: [id number]    Admitting diagnosis: Alcohol withdrawal syndrome    Presentation to hospital: Vomiting, Tremor, Agitation    Surgery: no no    Nursing assessment at handoff  stable    Emergency Contact/POA:Jose R Frost (Brother )[phone]  Family updated: yes - -    Most recent vitals: BP (!) 155/95   Pulse 70   Temp 98.4 °F (36.9 °C) (Axillary)   Resp 16   Ht 1.803 m (5' 11\")   Wt 62.7 kg (138 lb 3.7 oz)   SpO2 99%   BMI 19.28 kg/m²      Rhythm: Normal Sinus Rhythm 80 and Sinus Tachycardia 110     NC/HFNC- 0 lpm   Respiratory support: - No ventilator support    Vent days: Day 0    Increased O2 requirements: no no    Admission weight Weight - Scale: 62.7 kg (138 lb 3.7 oz)  Today's weight   Wt Readings from Last 1 Encounters:   11/12/24 62.7 kg (138 lb 3.7 oz)         UOP >30ml/hr: no     Hanson need assessed each shift: no    Restraints: no  Order current and documentation up to date?    Lines/Drains  LDA Insertion Date Discontinued Date Dressing Changes   PIV x2 11/11/24 11/12/24     TLC       Arterial       Hanson       Vas Cath      ETT       Surgical drains        Night Shift Hospitalist Interventions    Problem(Brief) Date Time Intervention Physician contacted                                               Drip rates at handoff:    sodium chloride      lactated ringers 125 mL/hr at 11/13/24 0354    dexmedeTOMIDine Stopped (11/12/24 1024)       Hospital Course Daily Updates:  Admit Day# 0   Came to ED due to vomiting, agitation, tremor  -CIWA score high >30 (on PRN ativan 4mg IVP , started on precedex gtts    Day 1-11/12  -Precedex weaned off  -Occasional PRN ativan given  -Mostly behaves himself-can be impulsive and quick to move  - Low urine output- urine very dark, dehydrated.     11/13/24 Days  -BM x1  -Gave 19 mg of Ativan total based on CIWA score  -External male cath replaced  -Got very agitated around 1730 and tried to jump out of bed. He also called his brother Finn who reinforced what

## 2024-11-13 NOTE — PLAN OF CARE
Problem: Discharge Planning  Goal: Discharge to home or other facility with appropriate resources  Outcome: Progressing  Flowsheets (Taken 11/12/2024 2000)  Discharge to home or other facility with appropriate resources:   Identify barriers to discharge with patient and caregiver   Arrange for needed discharge resources and transportation as appropriate   Identify discharge learning needs (meds, wound care, etc)   Refer to discharge planning if patient needs post-hospital services based on physician order or complex needs related to functional status, cognitive ability or social support system     Problem: Pain  Goal: Verbalizes/displays adequate comfort level or baseline comfort level  Outcome: Progressing  Flowsheets (Taken 11/12/2024 2000)  Verbalizes/displays adequate comfort level or baseline comfort level:   Encourage patient to monitor pain and request assistance   Assess pain using appropriate pain scale   Administer analgesics based on type and severity of pain and evaluate response   Implement non-pharmacological measures as appropriate and evaluate response   Consider cultural and social influences on pain and pain management   Notify Licensed Independent Practitioner if interventions unsuccessful or patient reports new pain     Problem: Safety - Adult  Goal: Free from fall injury  Outcome: Progressing     Problem: ABCDS Injury Assessment  Goal: Absence of physical injury  Outcome: Progressing     Problem: Risk for Elopement  Goal: Patient will not exit the unit/facility without proper excort  Outcome: Progressing  Flowsheets (Taken 11/12/2024 2000)  Nursing Interventions for Elopement Risk:   Assist with personal care needs such as toileting, eating, dressing, as needed to reduce the risk of wandering   Collaborate with treatment team for drug withdrawal symptoms treatment

## 2024-11-13 NOTE — PROGRESS NOTES
Shift: [id number]    Admitting diagnosis: Alcohol withdrawal syndrome    Presentation to hospital: Vomiting, Tremor, Agitation    Surgery: no no    Nursing assessment at handoff  stable    Emergency Contact/POA:Jose R Frost (Brother )[phone]  Family updated: yes - -    Most recent vitals: BP (!) 158/96   Pulse 67   Temp 98.6 °F (37 °C) (Axillary)   Resp 16   Ht 1.803 m (5' 11\")   Wt 62.7 kg (138 lb 3.7 oz)   SpO2 98%   BMI 19.28 kg/m²      Rhythm: Normal Sinus Rhythm 80 and Sinus Tachycardia 110     NC/HFNC- 0 lpm   Respiratory support: - No ventilator support    Vent days: Day 0    Increased O2 requirements: no no    Admission weight Weight - Scale: 62.7 kg (138 lb 3.7 oz)  Today's weight   Wt Readings from Last 1 Encounters:   11/12/24 62.7 kg (138 lb 3.7 oz)         UOP >30ml/hr: no     Hanson need assessed each shift: no    Restraints: no  Order current and documentation up to date?    Lines/Drains  LDA Insertion Date Discontinued Date Dressing Changes   PIV x2 11/11/24 11/12/24     TLC       Arterial       Hanson       Vas Cath      ETT       Surgical drains        Night Shift Hospitalist Interventions    Problem(Brief) Date Time Intervention Physician contacted                                               Drip rates at handoff:    sodium chloride      lactated ringers 125 mL/hr at 11/13/24 1418    dexmedeTOMIDine Stopped (11/12/24 1024)       Hospital Course Daily Updates:  Admit Day# 0   Came to ED due to vomiting, agitation, tremor  -CIWA score high >30 (on PRN ativan 4mg IVP , started on precedex gtts    Day 1-11/12  -Precedex weaned off  -Occasional PRN ativan given  -Mostly behaves himself-can be impulsive and quick to move  - Low urine output- urine very dark, dehydrated.     11/13/24 Days  -BM x1  -Gave 21 mg of Ativan total based on CIWA score  -External male cath replaced  -Got very agitated around 1730 and tried to jump out of bed. He also called his brother Finn who reinforced what nursing

## 2024-11-14 LAB
ALBUMIN SERPL-MCNC: 4 G/DL (ref 3.4–5)
ANION GAP SERPL CALCULATED.3IONS-SCNC: 14 MMOL/L (ref 3–16)
BUN SERPL-MCNC: 6 MG/DL (ref 7–20)
CALCIUM SERPL-MCNC: 9 MG/DL (ref 8.3–10.6)
CHLORIDE SERPL-SCNC: 100 MMOL/L (ref 99–110)
CO2 SERPL-SCNC: 25 MMOL/L (ref 21–32)
CREAT SERPL-MCNC: 0.7 MG/DL (ref 0.9–1.3)
DEPRECATED RDW RBC AUTO: 13.6 % (ref 12.4–15.4)
GFR SERPLBLD CREATININE-BSD FMLA CKD-EPI: >90 ML/MIN/{1.73_M2}
GLUCOSE SERPL-MCNC: 95 MG/DL (ref 70–99)
HCT VFR BLD AUTO: 39.7 % (ref 40.5–52.5)
HGB BLD-MCNC: 13.7 G/DL (ref 13.5–17.5)
MCH RBC QN AUTO: 33.6 PG (ref 26–34)
MCHC RBC AUTO-ENTMCNC: 34.4 G/DL (ref 31–36)
MCV RBC AUTO: 97.6 FL (ref 80–100)
PHOSPHATE SERPL-MCNC: 4.1 MG/DL (ref 2.5–4.9)
PLATELET # BLD AUTO: 163 K/UL (ref 135–450)
PMV BLD AUTO: 7.8 FL (ref 5–10.5)
POTASSIUM SERPL-SCNC: 3 MMOL/L (ref 3.5–5.1)
RBC # BLD AUTO: 4.07 M/UL (ref 4.2–5.9)
SODIUM SERPL-SCNC: 139 MMOL/L (ref 136–145)
WBC # BLD AUTO: 6 K/UL (ref 4–11)

## 2024-11-14 PROCEDURE — 85027 COMPLETE CBC AUTOMATED: CPT

## 2024-11-14 PROCEDURE — 6370000000 HC RX 637 (ALT 250 FOR IP)

## 2024-11-14 PROCEDURE — 2580000003 HC RX 258: Performed by: FAMILY MEDICINE

## 2024-11-14 PROCEDURE — 2000000000 HC ICU R&B

## 2024-11-14 PROCEDURE — 6360000002 HC RX W HCPCS: Performed by: FAMILY MEDICINE

## 2024-11-14 PROCEDURE — 6370000000 HC RX 637 (ALT 250 FOR IP): Performed by: INTERNAL MEDICINE

## 2024-11-14 PROCEDURE — 6370000000 HC RX 637 (ALT 250 FOR IP): Performed by: FAMILY MEDICINE

## 2024-11-14 PROCEDURE — 80069 RENAL FUNCTION PANEL: CPT

## 2024-11-14 RX ORDER — GABAPENTIN 100 MG/1
100 CAPSULE ORAL 3 TIMES DAILY
Status: DISCONTINUED | OUTPATIENT
Start: 2024-11-14 | End: 2024-11-15 | Stop reason: HOSPADM

## 2024-11-14 RX ADMIN — SPIRONOLACTONE 25 MG: 25 TABLET, FILM COATED ORAL at 08:03

## 2024-11-14 RX ADMIN — POTASSIUM CHLORIDE 40 MEQ: 1500 TABLET, EXTENDED RELEASE ORAL at 15:09

## 2024-11-14 RX ADMIN — SODIUM CHLORIDE, PRESERVATIVE FREE 10 ML: 5 INJECTION INTRAVENOUS at 08:06

## 2024-11-14 RX ADMIN — ACETAMINOPHEN 650 MG: 325 TABLET ORAL at 08:23

## 2024-11-14 RX ADMIN — VALSARTAN 40 MG: 80 TABLET, FILM COATED ORAL at 08:02

## 2024-11-14 RX ADMIN — ENOXAPARIN SODIUM 40 MG: 100 INJECTION SUBCUTANEOUS at 08:05

## 2024-11-14 RX ADMIN — POTASSIUM CHLORIDE 10 MEQ: 7.46 INJECTION, SOLUTION INTRAVENOUS at 08:02

## 2024-11-14 RX ADMIN — ATORVASTATIN CALCIUM 20 MG: 10 TABLET, FILM COATED ORAL at 08:02

## 2024-11-14 RX ADMIN — SODIUM CHLORIDE, PRESERVATIVE FREE 10 ML: 5 INJECTION INTRAVENOUS at 20:50

## 2024-11-14 RX ADMIN — METOPROLOL SUCCINATE 12.5 MG: 25 TABLET, EXTENDED RELEASE ORAL at 08:24

## 2024-11-14 RX ADMIN — MUPIROCIN: 20 OINTMENT TOPICAL at 20:51

## 2024-11-14 RX ADMIN — Medication 100 MG: at 08:02

## 2024-11-14 RX ADMIN — GABAPENTIN 100 MG: 100 CAPSULE ORAL at 20:51

## 2024-11-14 RX ADMIN — ASPIRIN 81 MG: 81 TABLET, CHEWABLE ORAL at 08:04

## 2024-11-14 RX ADMIN — MUPIROCIN: 20 OINTMENT TOPICAL at 08:06

## 2024-11-14 ASSESSMENT — PAIN SCALES - GENERAL: PAINLEVEL_OUTOF10: 3

## 2024-11-14 NOTE — PROGRESS NOTES
--------------------------------------------------    [facility] (any 2 required for High level billing)    A. Problems (any 1)  [x] Acute/Chronic Illness/injury posing ongoing threat to life and/or bodily function without ongoing treatment    [] Severe exacerbation of chronic illness    --------------------------------------------------  B. Risk of Treatment (any 1)    [x] Drugs/treatments that require intensive monitoring for toxicity    [] IV ABX (Vancomycin, Aminoglycosides, etc)     [] Post-Cath/Contrast study requiring serial monitoring    [] IV Narcotic analgesia    [] Aggressive IV diuresis    [] Hypertonic Saline    [] Critical electrolyte abnormalities requiring IV replacement    [] Insulin - Scheduled/SSI or Insulin gtt    [] Anticoagulation (Heparin gtt or Coumadin - other anticoagulants in special circumstances)    [] Cardiac Medications (IV Amiodarone/Diltiazem, Tikosyn, etc)    [] Hemodialysis    [x] Other -  CIWA  [] Change in code status    [] Decision to escalate care    [] Major surgery/procedure with associated risk factors    --------------------------------------------------  C. Data (any 2)    [] Data Review (any 3)    [] Consultant notes from yesterday/today    [x] All available current labs reviewed interpreted for clinical significance    [x] Appropriate follow-up labs were ordered  [] Collateral history obtained     [x] Independent Interpretation of tests (any 1)    [x] Telemetry (Rhythm Strip) personally reviewed and interpreted        [] Imaging personally reviewed and interpreted     [x] Discussion (any 1)  [x] Multi-Disciplinary Rounds with Case Management  [] Discussed management of the case with           Labs:  Personally reviewed on 11/14/2024 and interpreted for clinical significance as documented above.     Recent Labs     11/12/24 0215 11/13/24  0432 11/14/24  0425   WBC 8.0 5.7 6.0   HGB 12.6* 11.0* 13.7   HCT 37.5* 32.9* 39.7*    156 163     Recent Labs     11/11/24  0262  11/12/24 0215 11/13/24 0432 11/14/24 0425    142 139 139   K 3.3* 4.0 3.6 3.0*   CL 94* 105 102 100   CO2 21 22 21 25   BUN 9 9 5* 6*   CREATININE 1.2 0.8* 0.5* 0.7*   CALCIUM 10.3 8.9 8.2* 9.0   MG 1.74* 1.85 1.37*  --    PHOS  --   --   --  4.1     No results for input(s): \"PROBNP\", \"TROPHS\" in the last 72 hours.  No results for input(s): \"LABA1C\" in the last 72 hours.  Recent Labs     11/11/24 2328 11/12/24 0215 11/13/24 0432   AST 59* 43* 54*   ALT 56* 41* 38   BILITOT 0.4 0.3 0.3   ALKPHOS 111 81 57     Recent Labs     11/13/24 0432   LACTA 5.9*       Urine Cultures: No results found for: \"LABURIN\"  Blood Cultures: No results found for: \"BC\"  No results found for: \"BLOODCULT2\"  Organism: No results found for: \"ORG\"      Yoav Li MD

## 2024-11-14 NOTE — PROGRESS NOTES
Received bedside report from off going RN.  Pts skin was assessed. Turned and repositioned. Orders verified. Pt A&O able to follow commands. Call light in reach, bed in lowest position, will continue to monitor.

## 2024-11-15 VITALS
WEIGHT: 138.23 LBS | HEIGHT: 71 IN | TEMPERATURE: 98 F | SYSTOLIC BLOOD PRESSURE: 143 MMHG | DIASTOLIC BLOOD PRESSURE: 99 MMHG | RESPIRATION RATE: 16 BRPM | HEART RATE: 96 BPM | BODY MASS INDEX: 19.35 KG/M2 | OXYGEN SATURATION: 98 %

## 2024-11-15 LAB
ALBUMIN SERPL-MCNC: 4.1 G/DL (ref 3.4–5)
ALP SERPL-CCNC: 87 U/L (ref 40–129)
ALT SERPL-CCNC: 66 U/L (ref 10–40)
ANION GAP SERPL CALCULATED.3IONS-SCNC: 13 MMOL/L (ref 3–16)
AST SERPL-CCNC: 55 U/L (ref 15–37)
BILIRUB DIRECT SERPL-MCNC: 0.2 MG/DL (ref 0–0.3)
BILIRUB INDIRECT SERPL-MCNC: 0.4 MG/DL (ref 0–1)
BILIRUB SERPL-MCNC: 0.6 MG/DL (ref 0–1)
BUN SERPL-MCNC: 8 MG/DL (ref 7–20)
CALCIUM SERPL-MCNC: 9.2 MG/DL (ref 8.3–10.6)
CHLORIDE SERPL-SCNC: 101 MMOL/L (ref 99–110)
CO2 SERPL-SCNC: 24 MMOL/L (ref 21–32)
CREAT SERPL-MCNC: 0.8 MG/DL (ref 0.9–1.3)
DEPRECATED RDW RBC AUTO: 13.4 % (ref 12.4–15.4)
GFR SERPLBLD CREATININE-BSD FMLA CKD-EPI: >90 ML/MIN/{1.73_M2}
GLUCOSE SERPL-MCNC: 99 MG/DL (ref 70–99)
HCT VFR BLD AUTO: 41.1 % (ref 40.5–52.5)
HGB BLD-MCNC: 14 G/DL (ref 13.5–17.5)
MCH RBC QN AUTO: 32.7 PG (ref 26–34)
MCHC RBC AUTO-ENTMCNC: 34 G/DL (ref 31–36)
MCV RBC AUTO: 96.1 FL (ref 80–100)
PHOSPHATE SERPL-MCNC: 4.2 MG/DL (ref 2.5–4.9)
PLATELET # BLD AUTO: 159 K/UL (ref 135–450)
PMV BLD AUTO: 8 FL (ref 5–10.5)
POTASSIUM SERPL-SCNC: 3.1 MMOL/L (ref 3.5–5.1)
PROT SERPL-MCNC: 6.8 G/DL (ref 6.4–8.2)
RBC # BLD AUTO: 4.28 M/UL (ref 4.2–5.9)
SODIUM SERPL-SCNC: 138 MMOL/L (ref 136–145)
WBC # BLD AUTO: 10 K/UL (ref 4–11)

## 2024-11-15 PROCEDURE — 97530 THERAPEUTIC ACTIVITIES: CPT

## 2024-11-15 PROCEDURE — 80076 HEPATIC FUNCTION PANEL: CPT

## 2024-11-15 PROCEDURE — 97535 SELF CARE MNGMENT TRAINING: CPT

## 2024-11-15 PROCEDURE — 97162 PT EVAL MOD COMPLEX 30 MIN: CPT

## 2024-11-15 PROCEDURE — 80069 RENAL FUNCTION PANEL: CPT

## 2024-11-15 PROCEDURE — 97166 OT EVAL MOD COMPLEX 45 MIN: CPT

## 2024-11-15 PROCEDURE — 85027 COMPLETE CBC AUTOMATED: CPT

## 2024-11-15 PROCEDURE — 6370000000 HC RX 637 (ALT 250 FOR IP): Performed by: INTERNAL MEDICINE

## 2024-11-15 PROCEDURE — 6370000000 HC RX 637 (ALT 250 FOR IP)

## 2024-11-15 PROCEDURE — 6370000000 HC RX 637 (ALT 250 FOR IP): Performed by: FAMILY MEDICINE

## 2024-11-15 PROCEDURE — 6370000000 HC RX 637 (ALT 250 FOR IP): Performed by: EMERGENCY MEDICINE

## 2024-11-15 PROCEDURE — 2580000003 HC RX 258: Performed by: FAMILY MEDICINE

## 2024-11-15 RX ADMIN — ATORVASTATIN CALCIUM 20 MG: 10 TABLET, FILM COATED ORAL at 09:52

## 2024-11-15 RX ADMIN — METOPROLOL SUCCINATE 12.5 MG: 25 TABLET, EXTENDED RELEASE ORAL at 09:52

## 2024-11-15 RX ADMIN — SODIUM CHLORIDE, PRESERVATIVE FREE 10 ML: 5 INJECTION INTRAVENOUS at 09:25

## 2024-11-15 RX ADMIN — VALSARTAN 40 MG: 80 TABLET, FILM COATED ORAL at 09:52

## 2024-11-15 RX ADMIN — GABAPENTIN 100 MG: 100 CAPSULE ORAL at 09:52

## 2024-11-15 RX ADMIN — SPIRONOLACTONE 25 MG: 25 TABLET, FILM COATED ORAL at 09:52

## 2024-11-15 RX ADMIN — ACETAMINOPHEN 650 MG: 325 TABLET ORAL at 11:23

## 2024-11-15 RX ADMIN — ASPIRIN 81 MG: 81 TABLET, CHEWABLE ORAL at 09:52

## 2024-11-15 RX ADMIN — Medication 100 MG: at 09:52

## 2024-11-15 RX ADMIN — LORAZEPAM 1 MG: 1 TABLET ORAL at 00:33

## 2024-11-15 ASSESSMENT — PAIN SCALES - GENERAL: PAINLEVEL_OUTOF10: 5

## 2024-11-15 NOTE — PROGRESS NOTES
Hospital Medicine Progress Note  V 10.25      Date of Admission: 11/11/2024    Hospital Day: 5      Chief Admission Complaint:  Alcohol Withdrawal    Subjective:  no new c/o.     Presenting Admission History:       44 y.o. male with a past medical history significant for alcohol abuse presents with alcohol withdrawal.  Reports that last drink was around 9 PM approximately 27 hours prior to presentation.  He reports symptoms of tremor and agitation.  He reports that he has a history of seizures and sees a neurologist, but is not on any medication for this.  He reports that he does take gabapentin.     Patient is a poor historian is difficult to understand timeline.  His alcohol intake is reportedly 5 vodka tonics per week.  It is felt that he is likely minimizing the amount that he actually takes.  He denies any other illicit drug use    Assessment/Plan:      Current Principal Problem:  Alcohol withdrawal syndrome, uncomplicated (HCC)      Alcohol Abuse - active and ongoing w/ dependence and withdrawal w/ last drink 10 Nov.  Cessation counseled.  Admitted to ICU and placed on Precedex gtt, now off.      HTN w/ CAD - w/ known CAD but no evidence of active signs and/or symptoms of ischemia and/or failure. Currently controlled on home meds w/ vitals documented and reviewed.      HyperLipidemia - normally controlled on home Statin. Continued.  Follow up / PCP outpatient for medication initiation and/or adjustment as needed.        Seizures -  Diagnosed with Psychogenic Non-Epileptic Seizures, follows with  Neurology.  Continue seizure precautions     Transaminitis - acute alcoholic hepatitis.  Asymptomatic.  Will continue to follow serial labs.  Reviewed and documented in this note    CHF - chronic diastolic failure w/ rpreserved EF 55-60% by Echo dated October 2024 at , apparently improved from prior.  Likely due to hypertensive and/or ischemic heart disease.  Patient is euvolemic w/ no evidence of acute  []LTAC  []Hospice  []Other -    Anticipated Discharge Day/Date:  Transferred from ICU to Floor 14 Nov.     Barriers to Discharge: Clinical course  and PT/OT eval  --------------------------------------------------    MDM (any 2 required for High level billing)    A. Problems (any 1)  [x] Acute/Chronic Illness/injury posing ongoing threat to life and/or bodily function without ongoing treatment    [] Severe exacerbation of chronic illness    --------------------------------------------------  B. Risk of Treatment (any 1)    [x] Drugs/treatments that require intensive monitoring for toxicity    [] IV ABX (Vancomycin, Aminoglycosides, etc)     [] Post-Cath/Contrast study requiring serial monitoring    [] IV Narcotic analgesia    [] Aggressive IV diuresis    [] Hypertonic Saline    [] Critical electrolyte abnormalities requiring IV replacement    [] Insulin - Scheduled/SSI or Insulin gtt    [] Anticoagulation (Heparin gtt or Coumadin - other anticoagulants in special circumstances)    [] Cardiac Medications (IV Amiodarone/Diltiazem, Tikosyn, etc)    [] Hemodialysis    [x] Other -  CIWA  [] Change in code status    [] Decision to escalate care    [] Major surgery/procedure with associated risk factors    --------------------------------------------------  C. Data (any 2)    [] Data Review (any 3)    [] Consultant notes from yesterday/today    [x] All available current labs reviewed interpreted for clinical significance    [x] Appropriate follow-up labs were ordered  [] Collateral history obtained     [x] Independent Interpretation of tests (any 1)    [x] Telemetry (Rhythm Strip) personally reviewed and interpreted        [] Imaging personally reviewed and interpreted     [x] Discussion (any 1)  [x] Multi-Disciplinary Rounds with Case Management  [] Discussed management of the case with           Labs:  Personally reviewed on 11/15/2024 and interpreted for clinical significance as documented above.     Recent Labs

## 2024-11-15 NOTE — PROGRESS NOTES
Occupational Therapy  Facility/Department: [facility] C2 CARD TELEMETRY  Occupational Therapy Initial Assessment and Treatment    Name: Julian Frost  : 1980  MRN: [id number]  Date of Service: 11/15/2024    Discharge Recommendations:  24 hour supervision or assist, Home with Home health OT, S Level 1  OT Equipment Recommendations  Equipment Needed: No     Therapy discharge recommendations are subject to collaboration from the patient’s interdisciplinary healthcare team, including MD and case management recommendations.    Barriers to Home Discharge:   [x] Steps to access home entry or bed/bath:   [x] Unable to transfer, ambulate, or propel wheelchair household distances without assist   [x] Limited available assist at home upon discharge    [] Patient or family requests d/c to post-acute facility    [] Poor cognition/safety awareness for d/c to home alone    [] Unable to maintain ordered weight bearing status    [] Patient with salient signs of long-standing immobility   [x] Decreased independence with ADLs   [x] Increased risk for falls   [] Other:    If pt is unable to be seen after this session, please let this note serve as discharge summary.  Please see case management note for discharge disposition.  Thank you.    Patient Diagnosis(es): The primary encounter diagnosis was Alcohol withdrawal syndrome without complication (HCC). A diagnosis of Nausea vomiting and diarrhea was also pertinent to this visit.  Past Medical History:  has a past medical history of Seizures (HCC).  Past Surgical History:  has a past surgical history that includes Hip fracture surgery (Bilateral).           Assessment  Performance deficits / Impairments: Decreased functional mobility ;Decreased endurance;Decreased posture;Decreased ADL status;Decreased balance;Decreased safe awareness    Assessment: Pt is 43 yo male presenting from home alone w/ c/o unwitnessed seizure, found to have alcohol withdrawal syndrome. PLOF IND with all

## 2024-11-15 NOTE — PROGRESS NOTES
Physical Therapy  Facility/Department: [facility] C2 CARD TELEMETRY  Physical Therapy Initial Assessment and Treatment    Name: Julian Frost  : 1980  MRN: [id number]  Date of Service: 11/15/2024    Discharge Recommendations:  24 hour supervision or assist, Home with Home health PT          Patient Diagnosis(es): The primary encounter diagnosis was Alcohol withdrawal syndrome without complication (HCC). A diagnosis of Nausea vomiting and diarrhea was also pertinent to this visit.  Past Medical History:  has a past medical history of Seizures (HCC).  Past Surgical History:  has a past surgical history that includes Hip fracture surgery (Bilateral).    Assessment  Body Structures, Functions, Activity Limitations Requiring Skilled Therapeutic Intervention: Decreased functional mobility ;Decreased endurance;Decreased high-level IADLs;Decreased balance;Decreased safe awareness  Assessment: Patient has admitted to [facility] d/t alcohol withdrawal, N/V, hx of seizures. Patient presents with generalized weakness and inappropriate cardiac response to mobility, leading to difficulty with transfers, gait and stairs. Patient lives alone in apartment with 3 flights to enter at baseline. Recommend skilled PT services to address deficits. Recommend progress toward discharge home with 24-7 supervision and  PT at time of discharge.  Treatment Diagnosis: Generalized weakness  Therapy Prognosis: Good  Decision Making: Medium Complexity  Barriers to Learning: safety awareness  Requires PT Follow-Up: Yes  Activity Tolerance  Activity Tolerance: Treatment limited secondary to medical complications;Patient tolerated evaluation without incident    Plan  Physical Therapy Plan  General Plan: 3-5 times per week  Current Treatment Recommendations: Strengthening, ROM, Balance training, Functional mobility training, Transfer training, Endurance training, ADL/Self-care training, Gait training, Stair training, Neuromuscular re-education, Pain  Independent  Active : Yes  Mode of Transportation:  (scooter)  Occupation: Unemployed, Other(comment) (In process of getting on disability)  Leisure & Hobbies: used to snowboard, snow ski, [employer]; now likes to play pool, watch movies, reading  Additional Comments: 2 brothers live in town, maybe would be able to help if needed  Vision/Hearing  Vision  Vision: Impaired (sensivity to light)  Vision Exceptions: Wears glasses at all times  Hearing  Hearing: Within functional limits    Cognition   Orientation  Overall Orientation Status: Within Functional Limits  Orientation Level: Oriented X4  Cognition  Overall Cognitive Status: Exceptions  Arousal/Alertness: Appears intact  Following Commands: Follows one step commands consistently  Attention Span: Attends with cues to redirect  Memory: Appears intact  Safety Judgement: Decreased awareness of need for assistance;Decreased awareness of need for safety  Problem Solving: Assistance required to generate solutions  Insights: Decreased awareness of deficits  Initiation: Requires cues for some  Sequencing: Appears intact    Objective  Pulse: 96  O2 Device: None (Room air)  BP: (!) 143/99  MAP (Calculated): 114  BP Location: Right upper arm  BP Method: Automatic  Orthostatic B/P and Pulse?: Yes  Comment: After gait and standing in room: 152/101, 150 bpm in standing. Notified RN     Observation/Palpation  Posture: Good  Gross Assessment  AROM: Within functional limits  PROM: Within functional limits  Strength: Generally decreased, functional (d/t recent tibial plateau fracture, not formally assessed)  Coordination: Within functional limits  Tone: Normal  Sensation: Intact                Bed Mobility Training  Bed Mobility Training: Yes  Overall Level of Assistance: Stand-by assistance  Interventions: Safety awareness training;Verbal cues  Supine to Sit: Stand-by assistance (HOB 30*, use of rails)  Sit to Supine: Stand-by assistance (HOB 30*, use of rails)  Scooting:

## 2024-11-15 NOTE — CARE COORDINATION
CASE MANAGEMENT DISCHARGE SUMMARY      Discharge to: Home with family    Precertification completed: N/A  Hospital Exemption Notification (HENS) completed: N/A    IMM given: (date) N/A    New Durable Medical Equipment ordered/agency: No needed    Transportation:    Family/car: brother/private car       Confirmed discharge plan with :Patient discharging home .  Patient has been provided with all the Resource Folder information     Patient: yes     Family:  No patient will talk to his brother when he gets here        RN, name: Sahina    Note: Discharging nurse to complete RAMY, reconcile AVS, and place final copy with patient's discharge packet. RN to ensure that written prescriptions for  Level II medications are sent with patient to the facility as per protocol.    .Jaye Goss RN

## 2024-11-16 NOTE — DISCHARGE SUMMARY
Hospital Medicine Discharge Summary    Patient: Julian Frost   : 1980     Admit Date: 2024   Discharge Date: 11/15/2024    Disposition:  [x]Home   []HHC  []SNF  []Acute Rehab  []LTAC  []Hospice  Code status:  [x]Full  []DNR/CCA  []Limited (DNR/CCA with Do Not Intubate)  []DNRCC  Condition at Discharge: Stable  Primary Care Provider: Niyah Alanis DO    Admitting Provider: Jigar Oswald MD  Discharge Provider: Yoav Li MD     Discharge Diagnoses:      Active Hospital Problems    Diagnosis     Alcohol withdrawal syndrome, uncomplicated (HCC) [F10.930]        Presenting Admission History:        44 y.o. male with a past medical history significant for alcohol abuse presents with alcohol withdrawal.  Reports that last drink was around 9 PM approximately 27 hours prior to presentation.  He reports symptoms of tremor and agitation.  He reports that he has a history of seizures and sees a neurologist, but is not on any medication for this.  He reports that he does take gabapentin.     Patient is a poor historian is difficult to understand timeline.  His alcohol intake is reportedly 5 vodka tonics per week.  It is felt that he is likely minimizing the amount that he actually takes.  He denies any other illicit drug use     Assessment/Plan:           Alcohol Abuse - active and ongoing w/ dependence and withdrawal w/ last drink 10 Nov.  Cessation counseled.  Admitted to ICU and placed on Precedex gtt, now off.       HTN w/ CAD - w/ known CAD but no evidence of active signs and/or symptoms of ischemia and/or failure. Currently controlled on home meds w/ vitals documented and reviewed.       HyperLipidemia - normally controlled on home Statin. Continued.  Follow up / PCP outpatient for medication initiation and/or adjustment as needed.        Seizures -  Diagnosed with Psychogenic Non-Epileptic Seizures, follows with  Neurology.  Continue seizure precautions      Transaminitis - acute alcoholic